# Patient Record
Sex: FEMALE | Race: WHITE | NOT HISPANIC OR LATINO | URBAN - METROPOLITAN AREA
[De-identification: names, ages, dates, MRNs, and addresses within clinical notes are randomized per-mention and may not be internally consistent; named-entity substitution may affect disease eponyms.]

---

## 2018-12-10 ENCOUNTER — EMERGENCY (EMERGENCY)
Facility: HOSPITAL | Age: 33
LOS: 1 days | Discharge: HOME | End: 2018-12-10
Attending: EMERGENCY MEDICINE
Payer: SELF-PAY

## 2018-12-10 VITALS
DIASTOLIC BLOOD PRESSURE: 74 MMHG | OXYGEN SATURATION: 100 % | SYSTOLIC BLOOD PRESSURE: 112 MMHG | TEMPERATURE: 98 F | HEART RATE: 72 BPM | RESPIRATION RATE: 18 BRPM

## 2018-12-10 VITALS — WEIGHT: 134.48 LBS | HEIGHT: 59.06 IN

## 2018-12-10 LAB
ALBUMIN SERPL ELPH-MCNC: 3.3 G/DL — LOW (ref 3.5–5)
ALP SERPL-CCNC: 150 U/L — HIGH (ref 40–120)
ALT FLD-CCNC: 51 U/L DA — SIGNIFICANT CHANGE UP (ref 10–60)
ANION GAP SERPL CALC-SCNC: 8 MMOL/L — SIGNIFICANT CHANGE UP (ref 5–17)
AST SERPL-CCNC: 29 U/L — SIGNIFICANT CHANGE UP (ref 10–40)
BASOPHILS # BLD AUTO: 0.1 K/UL — SIGNIFICANT CHANGE UP (ref 0–0.2)
BASOPHILS NFR BLD AUTO: 1 % — SIGNIFICANT CHANGE UP (ref 0–2)
BILIRUB SERPL-MCNC: 0.3 MG/DL — SIGNIFICANT CHANGE UP (ref 0.2–1.2)
BUN SERPL-MCNC: 10 MG/DL — SIGNIFICANT CHANGE UP (ref 7–18)
CALCIUM SERPL-MCNC: 8.2 MG/DL — LOW (ref 8.4–10.5)
CHLORIDE SERPL-SCNC: 107 MMOL/L — SIGNIFICANT CHANGE UP (ref 96–108)
CO2 SERPL-SCNC: 26 MMOL/L — SIGNIFICANT CHANGE UP (ref 22–31)
CREAT SERPL-MCNC: 0.64 MG/DL — SIGNIFICANT CHANGE UP (ref 0.5–1.3)
EOSINOPHIL # BLD AUTO: 0.1 K/UL — SIGNIFICANT CHANGE UP (ref 0–0.5)
EOSINOPHIL NFR BLD AUTO: 1.5 % — SIGNIFICANT CHANGE UP (ref 0–6)
GLUCOSE SERPL-MCNC: 97 MG/DL — SIGNIFICANT CHANGE UP (ref 70–99)
HCG UR QL: NEGATIVE — SIGNIFICANT CHANGE UP
HCT VFR BLD CALC: 35.2 % — SIGNIFICANT CHANGE UP (ref 34.5–45)
HGB BLD-MCNC: 11.3 G/DL — LOW (ref 11.5–15.5)
INR BLD: 1.06 RATIO — SIGNIFICANT CHANGE UP (ref 0.88–1.16)
LYMPHOCYTES # BLD AUTO: 1.6 K/UL — SIGNIFICANT CHANGE UP (ref 1–3.3)
LYMPHOCYTES # BLD AUTO: 23.5 % — SIGNIFICANT CHANGE UP (ref 13–44)
MCHC RBC-ENTMCNC: 29.8 PG — SIGNIFICANT CHANGE UP (ref 27–34)
MCHC RBC-ENTMCNC: 32 GM/DL — SIGNIFICANT CHANGE UP (ref 32–36)
MCV RBC AUTO: 93 FL — SIGNIFICANT CHANGE UP (ref 80–100)
MONOCYTES # BLD AUTO: 0.6 K/UL — SIGNIFICANT CHANGE UP (ref 0–0.9)
MONOCYTES NFR BLD AUTO: 8.9 % — SIGNIFICANT CHANGE UP (ref 2–14)
NEUTROPHILS # BLD AUTO: 4.5 K/UL — SIGNIFICANT CHANGE UP (ref 1.8–7.4)
NEUTROPHILS NFR BLD AUTO: 65.2 % — SIGNIFICANT CHANGE UP (ref 43–77)
PLATELET # BLD AUTO: 239 K/UL — SIGNIFICANT CHANGE UP (ref 150–400)
POTASSIUM SERPL-MCNC: 3.6 MMOL/L — SIGNIFICANT CHANGE UP (ref 3.5–5.3)
POTASSIUM SERPL-SCNC: 3.6 MMOL/L — SIGNIFICANT CHANGE UP (ref 3.5–5.3)
PROT SERPL-MCNC: 7.5 G/DL — SIGNIFICANT CHANGE UP (ref 6–8.3)
PROTHROM AB SERPL-ACNC: 11.8 SEC — SIGNIFICANT CHANGE UP (ref 10–12.9)
RBC # BLD: 3.78 M/UL — LOW (ref 3.8–5.2)
RBC # FLD: 12.6 % — SIGNIFICANT CHANGE UP (ref 10.3–14.5)
SODIUM SERPL-SCNC: 141 MMOL/L — SIGNIFICANT CHANGE UP (ref 135–145)
TROPONIN I SERPL-MCNC: <0.015 NG/ML — SIGNIFICANT CHANGE UP (ref 0–0.04)
TROPONIN I SERPL-MCNC: <0.015 NG/ML — SIGNIFICANT CHANGE UP (ref 0–0.04)
WBC # BLD: 6.9 K/UL — SIGNIFICANT CHANGE UP (ref 3.8–10.5)
WBC # FLD AUTO: 6.9 K/UL — SIGNIFICANT CHANGE UP (ref 3.8–10.5)

## 2018-12-10 PROCEDURE — 85610 PROTHROMBIN TIME: CPT

## 2018-12-10 PROCEDURE — 96360 HYDRATION IV INFUSION INIT: CPT

## 2018-12-10 PROCEDURE — 96361 HYDRATE IV INFUSION ADD-ON: CPT

## 2018-12-10 PROCEDURE — 84484 ASSAY OF TROPONIN QUANT: CPT

## 2018-12-10 PROCEDURE — 99284 EMERGENCY DEPT VISIT MOD MDM: CPT

## 2018-12-10 PROCEDURE — 80053 COMPREHEN METABOLIC PANEL: CPT

## 2018-12-10 PROCEDURE — 82962 GLUCOSE BLOOD TEST: CPT

## 2018-12-10 PROCEDURE — 99284 EMERGENCY DEPT VISIT MOD MDM: CPT | Mod: 25

## 2018-12-10 PROCEDURE — 86900 BLOOD TYPING SEROLOGIC ABO: CPT

## 2018-12-10 PROCEDURE — 86901 BLOOD TYPING SEROLOGIC RH(D): CPT

## 2018-12-10 PROCEDURE — 36415 COLL VENOUS BLD VENIPUNCTURE: CPT

## 2018-12-10 PROCEDURE — 85027 COMPLETE CBC AUTOMATED: CPT

## 2018-12-10 PROCEDURE — 93005 ELECTROCARDIOGRAM TRACING: CPT

## 2018-12-10 PROCEDURE — 81025 URINE PREGNANCY TEST: CPT

## 2018-12-10 PROCEDURE — 86850 RBC ANTIBODY SCREEN: CPT

## 2018-12-10 RX ORDER — SODIUM CHLORIDE 9 MG/ML
1000 INJECTION INTRAMUSCULAR; INTRAVENOUS; SUBCUTANEOUS ONCE
Qty: 0 | Refills: 0 | Status: COMPLETED | OUTPATIENT
Start: 2018-12-10 | End: 2018-12-10

## 2018-12-10 RX ADMIN — SODIUM CHLORIDE 1000 MILLILITER(S): 9 INJECTION INTRAMUSCULAR; INTRAVENOUS; SUBCUTANEOUS at 19:07

## 2018-12-10 RX ADMIN — SODIUM CHLORIDE 1000 MILLILITER(S): 9 INJECTION INTRAMUSCULAR; INTRAVENOUS; SUBCUTANEOUS at 17:06

## 2018-12-10 NOTE — ED ADULT NURSE NOTE - OBJECTIVE STATEMENT
pt is a 32y/o female with c/o  syncope or dizziness states she felt lightheaded and  she passed out.

## 2018-12-10 NOTE — ED PROVIDER NOTE - PROGRESS NOTE DETAILS
schmidt: trop x 2 neg, isolated TWI V2.    dx syncope likely vasovagal.  f/u with cardio for holter/echo. left ambulatory.  return precautions given.

## 2018-12-10 NOTE — ED PROVIDER NOTE - OBJECTIVE STATEMENT
33 yr old female with hx of anemia presents to ed for syncope last night.  pt states stood up felt lighthead/dizzy and weak with palpitations then syncopized.  no incontinence, no seizure, no sob, no head trauma, no fever, no chills, no abd pain, no headache, no fam hx sudden cardiac death.  2-3 yr ago had similar sx and was told possibly anemia.  normal period cycle last 5 days but heavy.    [place] - [phone]

## 2018-12-10 NOTE — ED PROVIDER NOTE - MEDICAL DECISION MAKING DETAILS
33 yr old female with hx of anemia presents to ed for syncope last night.  pt states stood up felt lighthead/dizzy and weak with palpitations then syncopized.  no incontinence, no seizure, no sob, no head trauma, no fever, no chills, no abd pain, no headache, no fam hx sudden cardiac death.  2-3 yr ago had similar sx and was told possibly anemia.  normal period cycle last 5 days but heavy.    syncope- not consistent with seizure, no stroke like sx.  will obtain labs, ekg, cardiac monitor to r/o anemia vs arrhythmia vs lytes imbalance vs ectopic pregnancy

## 2020-12-23 NOTE — ED PROVIDER NOTE - CARE PLAN
Catheter removed. Sterile bandage to site. No evidence of bleeding or hematoma.  
Thoracentesis catheter placed  
Time out performed.  
Principal Discharge DX:	Syncope

## 2024-11-12 ENCOUNTER — APPOINTMENT (OUTPATIENT)
Dept: ANTEPARTUM | Facility: CLINIC | Age: 39
End: 2024-11-12
Payer: MEDICAID

## 2024-11-12 ENCOUNTER — ASOB RESULT (OUTPATIENT)
Age: 39
End: 2024-11-12

## 2024-11-12 DIAGNOSIS — O43.109 MALFORMATION OF PLACENTA, UNSPECIFIED, UNSPECIFIED TRIMESTER: ICD-10-CM

## 2024-11-12 PROCEDURE — 99205 OFFICE O/P NEW HI 60 MIN: CPT | Mod: 25

## 2024-11-12 PROCEDURE — 76805 OB US >/= 14 WKS SNGL FETUS: CPT

## 2024-11-12 PROCEDURE — ZZZZZ: CPT

## 2024-11-12 PROCEDURE — 76817 TRANSVAGINAL US OBSTETRIC: CPT

## 2024-11-13 ENCOUNTER — ASOB RESULT (OUTPATIENT)
Age: 39
End: 2024-11-13

## 2024-11-13 ENCOUNTER — NON-APPOINTMENT (OUTPATIENT)
Age: 39
End: 2024-11-13

## 2024-11-13 ENCOUNTER — APPOINTMENT (OUTPATIENT)
Dept: ANTEPARTUM | Facility: CLINIC | Age: 39
End: 2024-11-13
Payer: MEDICAID

## 2024-11-13 PROCEDURE — 99367 TEAM CONF W/O PAT BY PHYS: CPT

## 2024-11-14 ENCOUNTER — INPATIENT (INPATIENT)
Facility: HOSPITAL | Age: 39
LOS: 4 days | Discharge: HOME | End: 2024-11-19
Attending: OBSTETRICS & GYNECOLOGY | Admitting: OBSTETRICS & GYNECOLOGY
Payer: COMMERCIAL

## 2024-11-14 VITALS
HEART RATE: 78 BPM | DIASTOLIC BLOOD PRESSURE: 76 MMHG | TEMPERATURE: 98 F | RESPIRATION RATE: 18 BRPM | HEIGHT: 67 IN | SYSTOLIC BLOOD PRESSURE: 127 MMHG | OXYGEN SATURATION: 96 % | WEIGHT: 205.03 LBS

## 2024-11-14 DIAGNOSIS — O26.899 OTHER SPECIFIED PREGNANCY RELATED CONDITIONS, UNSPECIFIED TRIMESTER: ICD-10-CM

## 2024-11-14 DIAGNOSIS — Z34.80 ENCOUNTER FOR SUPERVISION OF OTHER NORMAL PREGNANCY, UNSPECIFIED TRIMESTER: ICD-10-CM

## 2024-11-14 LAB
ALBUMIN SERPL ELPH-MCNC: 3.1 G/DL — LOW (ref 3.3–5)
ALBUMIN SERPL ELPH-MCNC: 3.3 G/DL — SIGNIFICANT CHANGE UP (ref 3.3–5)
ALP SERPL-CCNC: 211 U/L — HIGH (ref 40–120)
ALP SERPL-CCNC: 214 U/L — HIGH (ref 40–120)
ALT FLD-CCNC: 23 U/L — SIGNIFICANT CHANGE UP (ref 10–45)
ALT FLD-CCNC: 23 U/L — SIGNIFICANT CHANGE UP (ref 10–45)
ANION GAP SERPL CALC-SCNC: 15 MMOL/L — SIGNIFICANT CHANGE UP (ref 5–17)
ANION GAP SERPL CALC-SCNC: 17 MMOL/L — SIGNIFICANT CHANGE UP (ref 5–17)
APTT BLD: 26.6 SEC — SIGNIFICANT CHANGE UP (ref 24.5–35.6)
APTT BLD: 27.3 SEC — SIGNIFICANT CHANGE UP (ref 24.5–35.6)
AST SERPL-CCNC: 25 U/L — SIGNIFICANT CHANGE UP (ref 10–40)
AST SERPL-CCNC: 25 U/L — SIGNIFICANT CHANGE UP (ref 10–40)
BASOPHILS # BLD AUTO: 0.03 K/UL — SIGNIFICANT CHANGE UP (ref 0–0.2)
BASOPHILS # BLD AUTO: 0.03 K/UL — SIGNIFICANT CHANGE UP (ref 0–0.2)
BASOPHILS NFR BLD AUTO: 0.2 % — SIGNIFICANT CHANGE UP (ref 0–2)
BASOPHILS NFR BLD AUTO: 0.3 % — SIGNIFICANT CHANGE UP (ref 0–2)
BILIRUB SERPL-MCNC: 0.3 MG/DL — SIGNIFICANT CHANGE UP (ref 0.2–1.2)
BILIRUB SERPL-MCNC: 0.4 MG/DL — SIGNIFICANT CHANGE UP (ref 0.2–1.2)
BUN SERPL-MCNC: 6 MG/DL — LOW (ref 7–23)
BUN SERPL-MCNC: 8 MG/DL — SIGNIFICANT CHANGE UP (ref 7–23)
CALCIUM SERPL-MCNC: 8.8 MG/DL — SIGNIFICANT CHANGE UP (ref 8.4–10.5)
CALCIUM SERPL-MCNC: 9.2 MG/DL — SIGNIFICANT CHANGE UP (ref 8.4–10.5)
CHLORIDE SERPL-SCNC: 104 MMOL/L — SIGNIFICANT CHANGE UP (ref 96–108)
CHLORIDE SERPL-SCNC: 106 MMOL/L — SIGNIFICANT CHANGE UP (ref 96–108)
CO2 SERPL-SCNC: 15 MMOL/L — LOW (ref 22–31)
CO2 SERPL-SCNC: 18 MMOL/L — LOW (ref 22–31)
CREAT SERPL-MCNC: 0.48 MG/DL — LOW (ref 0.5–1.3)
CREAT SERPL-MCNC: 0.53 MG/DL — SIGNIFICANT CHANGE UP (ref 0.5–1.3)
EGFR: 120 ML/MIN/1.73M2 — SIGNIFICANT CHANGE UP
EGFR: 123 ML/MIN/1.73M2 — SIGNIFICANT CHANGE UP
EOSINOPHIL # BLD AUTO: 0.01 K/UL — SIGNIFICANT CHANGE UP (ref 0–0.5)
EOSINOPHIL # BLD AUTO: 0.12 K/UL — SIGNIFICANT CHANGE UP (ref 0–0.5)
EOSINOPHIL NFR BLD AUTO: 0.1 % — SIGNIFICANT CHANGE UP (ref 0–6)
EOSINOPHIL NFR BLD AUTO: 1.2 % — SIGNIFICANT CHANGE UP (ref 0–6)
FIBRINOGEN PPP-MCNC: 518 MG/DL — HIGH (ref 200–445)
FIBRINOGEN PPP-MCNC: 544 MG/DL — HIGH (ref 200–445)
GLUCOSE BLDC GLUCOMTR-MCNC: 109 MG/DL — HIGH (ref 70–99)
GLUCOSE SERPL-MCNC: 105 MG/DL — HIGH (ref 70–99)
GLUCOSE SERPL-MCNC: 79 MG/DL — SIGNIFICANT CHANGE UP (ref 70–99)
HCT VFR BLD CALC: 30.9 % — LOW (ref 34.5–45)
HCT VFR BLD CALC: 32.7 % — LOW (ref 34.5–45)
HGB BLD-MCNC: 10.2 G/DL — LOW (ref 11.5–15.5)
HGB BLD-MCNC: 10.7 G/DL — LOW (ref 11.5–15.5)
IMM GRANULOCYTES NFR BLD AUTO: 0.7 % — SIGNIFICANT CHANGE UP (ref 0–0.9)
IMM GRANULOCYTES NFR BLD AUTO: 0.7 % — SIGNIFICANT CHANGE UP (ref 0–0.9)
INR BLD: 0.89 RATIO — SIGNIFICANT CHANGE UP (ref 0.85–1.16)
LYMPHOCYTES # BLD AUTO: 1.28 K/UL — SIGNIFICANT CHANGE UP (ref 1–3.3)
LYMPHOCYTES # BLD AUTO: 2.13 K/UL — SIGNIFICANT CHANGE UP (ref 1–3.3)
LYMPHOCYTES # BLD AUTO: 20.6 % — SIGNIFICANT CHANGE UP (ref 13–44)
LYMPHOCYTES # BLD AUTO: 9.9 % — LOW (ref 13–44)
MCHC RBC-ENTMCNC: 29.6 PG — SIGNIFICANT CHANGE UP (ref 27–34)
MCHC RBC-ENTMCNC: 29.7 PG — SIGNIFICANT CHANGE UP (ref 27–34)
MCHC RBC-ENTMCNC: 32.7 G/DL — SIGNIFICANT CHANGE UP (ref 32–36)
MCHC RBC-ENTMCNC: 33 G/DL — SIGNIFICANT CHANGE UP (ref 32–36)
MCV RBC AUTO: 89.8 FL — SIGNIFICANT CHANGE UP (ref 80–100)
MCV RBC AUTO: 90.6 FL — SIGNIFICANT CHANGE UP (ref 80–100)
MONOCYTES # BLD AUTO: 0.1 K/UL — SIGNIFICANT CHANGE UP (ref 0–0.9)
MONOCYTES # BLD AUTO: 0.6 K/UL — SIGNIFICANT CHANGE UP (ref 0–0.9)
MONOCYTES NFR BLD AUTO: 0.8 % — LOW (ref 2–14)
MONOCYTES NFR BLD AUTO: 5.8 % — SIGNIFICANT CHANGE UP (ref 2–14)
NEUTROPHILS # BLD AUTO: 11.38 K/UL — HIGH (ref 1.8–7.4)
NEUTROPHILS # BLD AUTO: 7.41 K/UL — HIGH (ref 1.8–7.4)
NEUTROPHILS NFR BLD AUTO: 71.4 % — SIGNIFICANT CHANGE UP (ref 43–77)
NEUTROPHILS NFR BLD AUTO: 88.3 % — HIGH (ref 43–77)
NRBC # BLD: 0 /100 WBCS — SIGNIFICANT CHANGE UP (ref 0–0)
NRBC # BLD: 0 /100 WBCS — SIGNIFICANT CHANGE UP (ref 0–0)
PLATELET # BLD AUTO: 305 K/UL — SIGNIFICANT CHANGE UP (ref 150–400)
PLATELET # BLD AUTO: 329 K/UL — SIGNIFICANT CHANGE UP (ref 150–400)
POTASSIUM SERPL-MCNC: 3.6 MMOL/L — SIGNIFICANT CHANGE UP (ref 3.5–5.3)
POTASSIUM SERPL-MCNC: 4.1 MMOL/L — SIGNIFICANT CHANGE UP (ref 3.5–5.3)
POTASSIUM SERPL-SCNC: 3.6 MMOL/L — SIGNIFICANT CHANGE UP (ref 3.5–5.3)
POTASSIUM SERPL-SCNC: 4.1 MMOL/L — SIGNIFICANT CHANGE UP (ref 3.5–5.3)
PROT SERPL-MCNC: 6.6 G/DL — SIGNIFICANT CHANGE UP (ref 6–8.3)
PROT SERPL-MCNC: 6.7 G/DL — SIGNIFICANT CHANGE UP (ref 6–8.3)
PROTHROM AB SERPL-ACNC: 10.2 SEC — SIGNIFICANT CHANGE UP (ref 9.9–13.4)
RBC # BLD: 3.44 M/UL — LOW (ref 3.8–5.2)
RBC # BLD: 3.61 M/UL — LOW (ref 3.8–5.2)
RBC # FLD: 13.2 % — SIGNIFICANT CHANGE UP (ref 10.3–14.5)
RBC # FLD: 13.3 % — SIGNIFICANT CHANGE UP (ref 10.3–14.5)
SODIUM SERPL-SCNC: 136 MMOL/L — SIGNIFICANT CHANGE UP (ref 135–145)
SODIUM SERPL-SCNC: 139 MMOL/L — SIGNIFICANT CHANGE UP (ref 135–145)
WBC # BLD: 10.36 K/UL — SIGNIFICANT CHANGE UP (ref 3.8–10.5)
WBC # BLD: 12.89 K/UL — HIGH (ref 3.8–10.5)
WBC # FLD AUTO: 10.36 K/UL — SIGNIFICANT CHANGE UP (ref 3.8–10.5)
WBC # FLD AUTO: 12.89 K/UL — HIGH (ref 3.8–10.5)

## 2024-11-14 PROCEDURE — 99253 IP/OBS CNSLTJ NEW/EST LOW 45: CPT

## 2024-11-14 PROCEDURE — 72195 MRI PELVIS W/O DYE: CPT | Mod: 26

## 2024-11-14 RX ORDER — 0.9 % SODIUM CHLORIDE 0.9 %
1000 INTRAVENOUS SOLUTION INTRAVENOUS
Refills: 0 | Status: DISCONTINUED | OUTPATIENT
Start: 2024-11-14 | End: 2024-11-15

## 2024-11-14 RX ORDER — .BETA.-CAROTENE, SODIUM ACETATE, ASCORBIC ACID, CHOLECALCIFEROL, .ALPHA.-TOCOPHEROL ACETATE, DL-, THIAMINE MONONITRATE, RIBOFLAVIN, NIACINAMIDE, PYRIDOXINE HYDROCHLORIDE, FOLIC ACID, CYANOCOBALAMIN, CALCIUM CARBONATE, FERROUS FUMARATE, ZINC OXIDE AND CUPRIC OXIDE 2000; 2000; 120; 400; 22; 1.84; 3; 20; 10; 1; 12; 200; 27; 25; 2 [IU]/1; [IU]/1; MG/1; [IU]/1; MG/1; MG/1; MG/1; MG/1; MG/1; MG/1; UG/1; MG/1; MG/1; MG/1; MG/1
1 TABLET ORAL DAILY
Refills: 0 | Status: DISCONTINUED | OUTPATIENT
Start: 2024-11-14 | End: 2024-11-15

## 2024-11-14 RX ORDER — HEPARIN SODIUM,PORCINE 1000/ML
5000 VIAL (ML) INJECTION EVERY 8 HOURS
Refills: 0 | Status: DISCONTINUED | OUTPATIENT
Start: 2024-11-14 | End: 2024-11-14

## 2024-11-14 RX ORDER — NIFEDIPINE 10 MG
10 CAPSULE ORAL
Refills: 0 | Status: DISCONTINUED | OUTPATIENT
Start: 2024-11-14 | End: 2024-11-14

## 2024-11-14 RX ORDER — NIFEDIPINE 10 MG
30 CAPSULE ORAL DAILY
Refills: 0 | Status: DISCONTINUED | OUTPATIENT
Start: 2024-11-14 | End: 2024-11-14

## 2024-11-14 RX ORDER — GLUCOSAMINE SULFATE DIPOT CHLR 500 MG
1 CAPSULE ORAL DAILY
Refills: 0 | Status: DISCONTINUED | OUTPATIENT
Start: 2024-11-14 | End: 2024-11-15

## 2024-11-14 RX ORDER — NIFEDIPINE 10 MG
10 CAPSULE ORAL EVERY 6 HOURS
Refills: 0 | Status: DISCONTINUED | OUTPATIENT
Start: 2024-11-14 | End: 2024-11-14

## 2024-11-14 RX ORDER — BETAMETHASONE ACETATE,SOD PHOS 6 MG/ML
12 VIAL (ML) INJECTION EVERY 24 HOURS
Refills: 0 | Status: DISCONTINUED | OUTPATIENT
Start: 2024-11-14 | End: 2024-11-14

## 2024-11-14 RX ORDER — BETAMETHASONE ACETATE,SOD PHOS 6 MG/ML
12 VIAL (ML) INJECTION ONCE
Refills: 0 | Status: COMPLETED | OUTPATIENT
Start: 2024-11-15 | End: 2024-11-15

## 2024-11-14 RX ORDER — NIFEDIPINE 10 MG
10 CAPSULE ORAL EVERY 6 HOURS
Refills: 0 | Status: DISCONTINUED | OUTPATIENT
Start: 2024-11-14 | End: 2024-11-15

## 2024-11-14 RX ORDER — NIFEDIPINE 10 MG
10 CAPSULE ORAL
Refills: 0 | Status: COMPLETED | OUTPATIENT
Start: 2024-11-14 | End: 2024-11-14

## 2024-11-14 RX ADMIN — Medication 10 MILLIGRAM(S): at 23:32

## 2024-11-14 RX ADMIN — Medication 50 MILLILITER(S): at 18:39

## 2024-11-14 RX ADMIN — Medication 12 MILLIGRAM(S): at 14:37

## 2024-11-14 RX ADMIN — Medication 10 MILLIGRAM(S): at 17:30

## 2024-11-14 RX ADMIN — Medication 10 MILLIGRAM(S): at 17:07

## 2024-11-14 RX ADMIN — Medication 300 GRAM(S): at 18:43

## 2024-11-14 RX ADMIN — Medication 10 MILLIGRAM(S): at 16:44

## 2024-11-14 NOTE — CONSULT NOTE ADULT - SUBJECTIVE AND OBJECTIVE BOX
HPI:  39y Female 33wkGA presents with concern for placenta accreta with possible invasion into bladder wall. Urology consulted for intra-operative assistance and ureteral catheter placement.   Pt seen at bedside. Complaining of stress incontinence that started prior to current pregnancy.     PAST MEDICAL & SURGICAL HISTORY:  No pertinent past medical history      No significant past surgical history          FAMILY HISTORY:  No known  malignancy     Denies alcohol and drug abuse, nonsmoker     MEDICATIONS  (STANDING):  betamethasone Injectable 12 milliGRAM(s) IntraMuscular every 24 hours  folic acid 1 milliGRAM(s) Oral daily  heparin   Injectable 5000 Unit(s) SubCutaneous every 8 hours  prenatal multivitamin 1 Tablet(s) Oral daily    MEDICATIONS  (PRN):    Allergies    No Known Allergies    Intolerances      REVIEW OF SYSTEMS: Pertinent positives and negatives as stated in HPI, otherwise negative    Vital signs  T(C): 36.9 (11-14-24 @ 10:05), Max: 36.9 (11-14-24 @ 10:05)  HR: 84 (11-14-24 @ 12:52)  BP: 125/76 (11-14-24 @ 10:31)  SpO2: 95% (11-14-24 @ 12:52)  Wt(kg): --    Physical Exam  Gen: NAD  HEENT: normocephalic, atraumatic, no scleral icterus  Pulm: No respiratory distress, no subcostal retractions, no accessory muscle use   Abd: distended 2/2 pregnancy   : Voiding   MSK: Moving all extremities, full ROM in all extremities  NEURO: A&Ox3, no focal neurological deficits, CN 2-12 grossly intact  SKIN: warm, dry    LABS:  CBC   BMP               Urine Cx:   Blood Cx:    Radiology: HPI:  39y Female 33wkGA presents with concern for placenta accreta with possible invasion into bladder wall. Urology consulted for intra-operative assistance and ureteral catheter placement.   [place]  bradenma #[id number]  Pt seen at bedside. Complaining of stress incontinence that started prior to current pregnancy.     PAST MEDICAL & SURGICAL HISTORY:  No pertinent past medical history      No significant past surgical history          FAMILY HISTORY:  No known  malignancy     Denies alcohol and drug abuse, nonsmoker     MEDICATIONS  (STANDING):  betamethasone Injectable 12 milliGRAM(s) IntraMuscular every 24 hours  folic acid 1 milliGRAM(s) Oral daily  heparin   Injectable 5000 Unit(s) SubCutaneous every 8 hours  prenatal multivitamin 1 Tablet(s) Oral daily    MEDICATIONS  (PRN):    Allergies    No Known Allergies    Intolerances      REVIEW OF SYSTEMS: Pertinent positives and negatives as stated in HPI, otherwise negative    Vital signs  T(C): 36.9 (11-14-24 @ 10:05), Max: 36.9 (11-14-24 @ 10:05)  HR: 84 (11-14-24 @ 12:52)  BP: 125/76 (11-14-24 @ 10:31)  SpO2: 95% (11-14-24 @ 12:52)  Wt(kg): --    Physical Exam  Gen: NAD  HEENT: normocephalic, atraumatic, no scleral icterus  Pulm: No respiratory distress, no subcostal retractions, no accessory muscle use   Abd: distended 2/2 pregnancy   : Voiding   MSK: Moving all extremities, full ROM in all extremities  NEURO: A&Ox3, no focal neurological deficits, CN 2-12 grossly intact  SKIN: warm, dry    LABS:  CBC   BMP               Urine Cx:   Blood Cx:    Radiology:

## 2024-11-14 NOTE — CHART NOTE - NSCHARTNOTEFT_GEN_A_CORE
0564 (Entry delayed secondary to clinical duties)  - [place]  #[id number]    Patient evaluated. Patient denying any contractions, abdominal pain, or further vaginal bleeding    Abd: Soft. Non-tender. Gravid   : No bleeding seen between patient's legs    38yo  at 33w3d with suspected increta who had an episode of vaginal bleeding prior to her MRI. Patient has had no vaginal bleeding since her initial evaluation. However, patient has been observed to be martha on the monitor (notes she is not feeling them). Given the persistence of the contractions espite initiation of Nifedipine tocolysis and recent vaginal bleeding, the myriad of consultants have been made aware of this change in clinical status and that tentative clinical plans may change. Current plan is as follows:  - c/w Nifedipine tocolysis  - Mg per MFM  - OR aware of patient  - Gyn/Onc aware  - IR aware  - Trauma surgery aware    Yuri Patel, PGY-3  Obstetrics & Gynecology     d/w Dr. RENETTA Rosenbaum, [facility]

## 2024-11-14 NOTE — CONSULT NOTE PEDS - ASSESSMENT
I met with Ms. Connors on the antepartum unit and discussed what to expect should she deliver at 33 weeks gestation.    She is a 40yo  @ 33w4d who presents for care coordination in the setting of recent ultrasound imaging findings concerning for accreta vs. increta on 2024. EFW 2818g.     1.	The NICU team will be present at her delivery and will immediately assess and care for her infant.  2.	Overall survival at 33 weeks gestation is 98%.   3.	The infant will likely require respiratory support, either in the form of nasal CPAP or intubation and mechanical ventilation.  4.	The infant will be at risk for jaundice which can be treated with phototherapy.  5.	Depending on the clinical status of the infant, enteral feedings may not be started immediately. The infant will receive IVF/IV nutrition as necessary. The infant is also at risk for hypoglycemia. Due to immature suck/swallow, the infant may require an orogastric tube once feeds are initiated.  6.	The infant will be screened for infection and treated with antibiotics at birth. If the infant's clinical status changes during his NICU course, he will again be screened and treated for infection.   7.	The infant is at risk for thermoregulation issues.    8.	All premature infants are at risk for developmental delays and will be monitored for the first two years of life by developmental pediatricians.   9.	Average length of stay is about 3 weeks.    Ms. Connors had the opportunity to ask questions and may contact the NICU at any time if further question arise.    Thank you for the opportunity to participate in the care of this patient and please inform us of any changes in her status.     number [phone]

## 2024-11-14 NOTE — CHART NOTE - NSCHARTNOTEFT_GEN_A_CORE
Patient evaluated at bedside for vaginal bleeding. Patient reports feeling a gush of blood. She denies feeling contraction pain    Vital Signs Last 24 Hrs  T(C): 36.8 (2024 18:57), Max: 36.9 (2024 10:05)  T(F): 98.24 (2024 18:57), Max: 98.4 (2024 10:05)  HR: 93 (2024 22:19) (76 - 127)  BP: 124/75 (2024 22:12) (100/52 - 152/77)  BP(mean): --  RR: 14 (2024 18:57) (14 - 18)  SpO2: 98% (2024 22:19) (92% - 100%)    Parameters below as of 2024 10:05  Patient On (Oxygen Delivery Method): room air    Physical exam:  Gen: well-appearing, NAD  Abd: soft, nontender  Vacc dark, old clot, no active bleeding from cervical os on speculum exam      38yo  at 33w3d with suspected increta now with vaginal bleeding 25cc of dark clot, no active bleeding from os. Vital signs stable. Patient with contractions on TOCO but does not feel pain. Given that patient has no active bleeding, plan is to continue to monitor. No delivery at this time.  - Reassess in 2 hours  - Early BMZ at 230am  - STAT labs  - AM labs  - c/w Nifedipine tocolysis  - Mg per MFM  - OR aware of patient  - Urology aware, plan for ureteral catheter placement   - Anesthesia aware  - Gyn/Onc aware  - IR aware  - Trauma surgery aware, plan for [place]  - Plan for  hysterectomy in AM    Patient seen and examined with Dr. Suero and Dr. Addie Benitez, PGY3

## 2024-11-14 NOTE — OB RN PATIENT PROFILE - NS_PAINMANGEPLANS_OBGYN_ALL_OB
Marcos Cabral Md  [address]  [facility], [address]       05/26/18        Patient: Brittany Young   YOB: 2008   Date of Visit: 5/26/2018       Dear  Dr. Brenton Cruz MD,      Thank you for referring Brittany Mosesness to [facility]
None

## 2024-11-14 NOTE — OB RN PATIENT PROFILE - MENTAL HEALTH CONDITIONS/SYMPTOMS, PROFILE
Continue [facility] soft diet with thin liquids as tolerated per SLP recommendations. Aspiration precautions and close monitoring of all po intake. none

## 2024-11-14 NOTE — OB RN TRIAGE NOTE - FALL HARM RISK - ATTEMPT OOB
No Taltz Pregnancy And Lactation Text: The risk during pregnancy and breastfeeding is uncertain with this medication.

## 2024-11-14 NOTE — CHART NOTE - NSCHARTNOTEFT_GEN_A_CORE
PA Note  Notified by RN that patient noted vaginal bleeding after using the bathroom. Red blood noted on pad  SSE: approx 25cc red to dark reg blood noted in vagina, cervix appears closed, no active bleeding.  Will continue to closely monitor. Accompanied pt to MRI.  Gina Marshall and Jorge Daigle PA-C

## 2024-11-14 NOTE — CONSULT NOTE ADULT - ASSESSMENT
39y Female 33wkGA presents with concern for placenta accreta with possible invasion into bladder wall. Urology consulted for intra-operative assistance and ureteral catheter placement.     Recs  - Primary team planning for surgery 11/19  - Please obtain UA and UCx  - Pt can follow-up out-pt for further management of stress incontinence if she desires once she's safe for discharge     [facility]  [address] [place] [id number] [phone]

## 2024-11-14 NOTE — OB PROVIDER H&P - ASSESSMENT
A/P: Pt is a 39y G_P_ who presents [active labor/SROM/PROM/ for induction of labor].      1. Admit to Labor and Delivery. Routine Labs. IV Fluids  2. Expectant Management vs. IOL  3. Fetus: Vertex, Reactive/Continue fetal monitoring  4.   5. GBS pos, for Amp / GBS neg  6. Pain: IV pain meds/epidural PRN      Yuri Patel, PGY-  Obstetrics and Gynecology    Discussed with  A/P: Pt is a 38yo  @33w4d who presents for care coordination in the setting of suspected placenta increta. Patient overall asymptomatic at time of initial evaluation without vaginal bleeding, pain, or contractions.    Patient was extensively counseled regarding the suspected increta and the next steps, notably care coordination with multiple consultants and their respective roles in their care): gyn/onc (for hysterectomy and bilateral salpingectomy) urology (for cysto and ureteral cath placement), IR (for procedural intervention to assist with bleeding), NICU (to review implications of pre-term delivery), and trauma surgery (in event measures are needed to mitigate hemorrhage and/or if there is adjacent organ injury). Will obtain pelvic MRI. Tentative plan discussed with the patient is for  hysterectomy, bilateral salpingectomy, cystoscopy, and bilateral ureteral catheter placement. Patient was consented for the aforementioned. Discussed that after hysterectomy, she will no longer be able to have future children. Reviewed that post-op course can potentially include SICU stay. Patient and partner verbalized understanding.     Additionally, we reviewed the plan to start Betamethasone given planned pre-term delivery. Patient and partner verbalized understanding in regards to the above. Given the opportunity to ask questions and have concerns addressed. All questions were answered to the patient's apparent satisfaction. They are aware that if there any changes in her clinical status     #Suspected Increta   - Pelvic MRI ordered   - Consulting with GYN/Onc, trauma surgery, IR, and urology  - 4 U of pRBCs on hold   - CBC and coags upon presentation    #Fetal status  - Continuous monitoring. Tracing has been reactive thus far    - BMZ for fetal lung maturity  - NICU consulted    #Maternal status  - NPO  - SCDs while in bed  - LR while NPO    Yuri Patel, PGY-3  Obstetrics and Gynecology    seen and evaluated w/ Dr. RENETTA Marshall-Rolando De La Torre, obgyn service attending, aware A/P: Pt is a 38yo  @33w4d who presents for care coordination in the setting of suspected placenta accreta vs. increta. Patient overall asymptomatic at time of initial evaluation without vaginal bleeding, pain, or contractions.    Patient was extensively counseled regarding the suspected increta and the next steps, notably care coordination with multiple consultants and their respective roles in their care): gyn/onc (for hysterectomy and bilateral salpingectomy) urology (for cysto and ureteral cath placement), IR (for procedural intervention to assist with bleeding), NICU (to review implications of pre-term delivery), and trauma surgery (in event measures are needed to mitigate hemorrhage and/or if there is adjacent organ injury). Will obtain pelvic MRI. Tentative plan discussed with the patient is for  hysterectomy, bilateral salpingectomy, cystoscopy, and bilateral ureteral catheter placement. Patient was consented for the aforementioned. Discussed that after hysterectomy, she will no longer be able to have future children. Reviewed that post-op course can potentially include SICU stay. Patient and partner verbalized understanding.     Additionally, we reviewed the plan to start Betamethasone given planned pre-term delivery. Patient and partner verbalized understanding in regards to the above. Given the opportunity to ask questions and have concerns addressed. All questions were answered to the patient's apparent satisfaction. They are aware that if there any changes in her clinical status     #Suspected accreta vs. increta   - Pelvic MRI ordered   - Consulting with GYN/Onc, trauma surgery, IR, and urology  - 4 U of pRBCs on hold   - CBC and coags upon presentation    #Fetal status  - Continuous monitoring. Tracing has been reactive thus far    - [facility] for fetal lung maturity  - NICU consulted    #Maternal status  - NPO  - SCDs while in bed  - LR while NPO    Yuri Patel, PGY-3  Obstetrics and Gynecology    seen and evaluated w/ Dr. RENETTA Marshall-Rolando De La Torre, obgyn service attending, aware

## 2024-11-14 NOTE — OB RN TRIAGE NOTE - NSMATERNALFETALCONCERNS_OBGYN_ALL_OB_FT
Maternal/Fetal Alert  11/13/24 - Suspected placenta accreta spectrum. Maternal mdm held today.  Please see minutes for details. -Opal Stratton, ALYSONC

## 2024-11-14 NOTE — OB PROVIDER H&P - HISTORY OF PRESENT ILLNESS
HPI: Pt is a 38yo  @ 33w3d who presents for care coordination in the setting of recent ultrasound imaging findings concerning for accreta vs. increta on 2024.  Fetal movement (+)  Leakage of fluid (-)  Contractions (-)  Vaginal bleeding (-)  GBS pos/neg  Estimated fetal weight:    Prenatal care complicated by: Above  - Patient had a known complete previa seen previously      - Low risk NIPs  - Nl hr GCT  - Elevated JOCELYN (24.5) w/ EFW 2818g (98%) and AC >99%     OBHx:  - . . 3400g. Uncomplicated. In [facility]  - . . 3600g. [facility]plicatedIn [facility]  - . D&C for miscarriage  - 2016. FT. Emergent pLTCS for NRFHT. 4262g  GynHx:   PMHx: Denies  PSHx: Denies  Med: PNV  All: NKDA  Psych: Denies hx of mental health issues  SH: Denies hx of smoking, drinking, or drug usage during the pregnancy    Vital Signs Last 24 Hrs  T(C): --  T(F): --  HR: 85 (2024 10:13) (85 - 85)  BP: 140/74 (2024 10:13) (140/74 - 140/74)  BP(mean): --  RR: --  SpO2: --        SVE:  FHT:  [place]:  Sono: Vertex           HPI: Pt is a 38yo  @ 33w3d who presents for care coordination in the setting of recent ultrasound imaging findings concerning for accreta vs. increta on 2024.  Fetal movement (+)  Leakage of fluid (-)  Contractions (-)  Vaginal bleeding (-)  GBS pos/neg  Estimated fetal weight:    Prenatal care complicated by: Above  - Patient had a known complete previa seen previously      - Low risk NIPs  - Nl hr GCT  - Elevated JOCELYN (24.5) w/ EFW 2818g (98%) and AC >99%     OBHx:  - . . 3400g. Uncomplicated. In [facility]  - . . 3600g. Uncomplicated. In [facility]  - . D&C for miscarriage  - 2016. FT. Emergent pLTCS for NRFHT. 4262g  GynHx:   PMHx: Denies  PSHx: Denies  Med: PNV  All: NKDA  Psych: Denies hx of mental health issues  SH: Denies hx of smoking, drinking, or drug usage during the pregnancy    Vital Signs Last 24 Hrs  T(C): --  T(F): --  HR: 85 (2024 10:13) (85 - 85)  BP: 140/74 (2024 10:13) (140/74 - 140/74)  BP(mean): --  RR: --  SpO2: --        SVE:  FHT:  [place]:  Sono: Vertex           HPI: Pt is a 40yo  @ 33w4d who presents for care coordination in the setting of recent ultrasound imaging findings concerning for accreta vs. increta on 2024.  Fetal movement (+)  Leakage of fluid (-)  Contractions (-)  Vaginal bleeding (-)  GBS unk  Estimated fetal weight: 2818g,     Prenatal care complicated by: Above  - Patient has a complete previa     - Low risk NIPs  - Nl hr GCT  - Elevated JOCELYN (24.5) w/ EFW 2818g (98%) and AC >99%     OBHx:  - . . 3400g. Uncomplicated. In [facility]  - . . 3600g. Uncomplicated. In [facility]  - . D&C for miscarriage  - 2016. FT. Emergent pLTCS for NRFHT. 4262g  GynHx: Denies  PMHx: Denies  PSHx: Above   Med: PNV  All: NKDA  SH: Denies hx of smoking, drinking, or drug usage during the pregnancy    Vital Signs Last 24 Hrs  T(C): --  T(F): --  HR: 85 (2024 10:13) (85 - 85)  BP: 140/74 (2024 10:13) (140/74 - 140/74)  BP(mean): --  RR: --  SpO2: --        SVE:  FHT:  [place]:  Sono: Vertex           [facility]  # [id number]    HPI: Pt is a 38yo  @ 33w4d who presents for care coordination in the setting of recent ultrasound imaging findings concerning for accreta vs. increta on 2024.  Fetal movement (+)  Leakage of fluid (-)  Contractions, denied feeling at time of inital eval (some ctx noted on the monitor however)  Vaginal bleeding (-)  GBS unk  Estimated fetal weight: 2818g,     Prenatal care complicated by: Above  - Patient has a complete previa     - Low risk NIPs  - Nl hr GCT  - Elevated JOCELYN (24.5) w/ EFW 2818g (98%) and AC >99%     OBHx:  - . . 3400g. Uncomplicated. In [facility]  - . . 3600g. Uncomplicated. In [facility]  - . D&C for miscarriage  - 2016. FT. Emergent pLTCS for NRFHT. 4262g  GynHx: Denies  PMHx: Denies  PSHx: Above   Med: PNV  All: NKDA  SH: Denies hx of smoking, drinking, or drug usage during the pregnancy    Vital Signs Last 24 Hrs  T(C): --  T(F): --  HR: 85 (2024 10:13) (85 - 85)  BP: 140/74 (2024 10:13) (140/74 - 140/74)  BP(mean): --  RR: --  SpO2: --        Gen: No acute distress. Awake. Alert  CV: Regular rate and rhythm. No murmurs appreciated  Pulm: Clear to auscultation bilaterally. No wheezes, crackles, or rhonchi  Abd: Soft. Gravid. Non-tender  Extremities: No pitting edema or calf tenderness bilaterally     FHT: 125/mod/(+)accels/(-)decels   [place]: q2-4min  Sono: Vertex    Gen: No acute distress. Awake. Alert  Pulm: Unlabored breathing. No respiratory distress   Abd: Soft. Gravid. Non-tender           [facility]  # [id number]    HPI: Pt is a 40yo  @ 33w4d who presents for care coordination in the setting of recent ultrasound imaging findings concerning for accreta vs. increta on 2024.  Fetal movement (+)  Leakage of fluid (-)  Contractions, denied feeling at time of inital eval (some ctx noted on the monitor however)  Vaginal bleeding (-)  GBS unk  Estimated fetal weight: 2818g,     Prenatal care complicated by: Above  - Patient has a complete previa     - Low risk NIPs  - Nl hr GCT  - Elevated JOCELYN (24.5) w/ EFW 2818g (98%) and AC >99%     OBHx:  - . . 3400g. Uncomplicated. In [facility]  - . . 3600g. Uncomplicated. In [facility]  - . D&C for miscarriage  - 2016. FT. pLTCS for NRFHT. 4262g  GynHx: Denies  PMHx: Denies  PSHx: Above   Med: PNV  All: NKDA  SH: Denies hx of smoking, drinking, or drug usage during the pregnancy    Vital Signs Last 24 Hrs  T(C): --  T(F): --  HR: 85 (2024 10:13) (85 - 85)  BP: 140/74 (2024 10:13) (140/74 - 140/74)  BP(mean): --  RR: --  SpO2: --        Gen: No acute distress. Awake. Alert  CV: Regular rate and rhythm. No murmurs appreciated  Pulm: Clear to auscultation bilaterally. No wheezes, crackles, or rhonchi  Abd: Soft. Gravid. Non-tender  Extremities: No pitting edema or calf tenderness bilaterally     FHT: 125/mod/(+)accels/(-)decels   [place]: q2-4min  Sono: Vertex    Gen: No acute distress. Awake. Alert  Pulm: Unlabored breathing. No respiratory distress   Abd: Soft. Gravid. Non-tender

## 2024-11-14 NOTE — OB RN PATIENT PROFILE - NSNWAMBCLINIC_OBGYN_ALL_OB
[facility] 865 Maternal Fetal Medicine Practice/Other [facility]65 [facility]/Other

## 2024-11-14 NOTE — CONSULT NOTE ADULT - SUBJECTIVE AND OBJECTIVE BOX
Interventional Radiology    HPI: 39y Female 33wkGA presents with concern for placenta accreta with possible invasion into bladder wall. IR consulted for possible intra-operative assistance.    Allergies: No Known Allergies    Medications (Abx/Cardiac/Anticoagulation/Blood Products)      Data:  170.2  93  T(C): 36.9  HR: 78  BP: 124/82  RR: 18  SpO2: 98%    -WBC 10.36 / HgB 10.2 / Hct 30.9 / Plt 305  -Na 139 / Cl 106 / BUN 6 / Glucose 79  -K 3.6 / CO2 18 / Cr 0.48  -ALT 23 / Alk Phos 211 / T.Bili 0.3  -INR -- / PTT 26.6    Radiology:     Assessment/Plan: 39y Female 33wkGA presents with concern for placenta accreta with possible invasion into bladder wall with plans for CS on 11/19/24. IR consulted for possible intra-operative assistance.    - IR is aware and will be available should emergent intervention be necessary. Case discussed with OB, no plans for preoperative catheter placement at this time. If plans change, please call IR.  - Case discussed with Dr. Bobby Willson.  - d/w primary team

## 2024-11-14 NOTE — OB RN PATIENT PROFILE - NSTRANFUSIONOBJECTION_GEN_ALL_CORE_SIUH
Detail Level: Detailed General Sunscreen Counseling: Recommend broad spectrum sunscreen with a SPF of 30 or higher. Sunscreens should be applied at least 15 minutes prior to expected sun exposure and then every 2 hours after that as long as sun exposure continues. If swimming or exercising sunscreen should be reapplied every 45 minutes to an hour after getting wet or sweating.  One ounce, or the equivalent of a shot glass full of sunscreen, is adequate to protect the skin not covered by a bathing suit. Sun protective clothing can be used in lieu of sunscreen but must be worn the entire time you are exposed to the sun's rays. Patient has no objection to blood transfusions.

## 2024-11-14 NOTE — OB PROVIDER H&P - NS_ADMITREASON_OBGYN_ALL_OB
• Baseline creatinine if 0 8 to 1  • Creatinine stable   • Nephrology following, appreciate recommendations  • IV Bumex decreased from twice a day to daily yesterday by nephrology  • Continue midodrine and octreotide per nephrology  • Avoid hypotension and nephrotoxins  • Monitor intake and output  • Daily weights  • Check BMP a m  Other

## 2024-11-14 NOTE — OB PROVIDER H&P - ATTENDING COMMENTS
Obstetrical  8am-6pm:  Patient neither nor and examined by me.  Agree with above resident note. Confirms what was presented to me by Dr. Rolando Marshall prior to her admission.  Liliana MORAN

## 2024-11-15 ENCOUNTER — TRANSCRIPTION ENCOUNTER (OUTPATIENT)
Age: 39
End: 2024-11-15

## 2024-11-15 ENCOUNTER — APPOINTMENT (OUTPATIENT)
Dept: ANTEPARTUM | Facility: CLINIC | Age: 39
End: 2024-11-15

## 2024-11-15 ENCOUNTER — APPOINTMENT (OUTPATIENT)
Dept: GYNECOLOGIC ONCOLOGY | Facility: HOSPITAL | Age: 39
End: 2024-11-15

## 2024-11-15 DIAGNOSIS — R73.9 HYPERGLYCEMIA, UNSPECIFIED: ICD-10-CM

## 2024-11-15 DIAGNOSIS — I10 ESSENTIAL (PRIMARY) HYPERTENSION: ICD-10-CM

## 2024-11-15 DIAGNOSIS — E87.20 ACIDOSIS, UNSPECIFIED: ICD-10-CM

## 2024-11-15 DIAGNOSIS — E11.10 TYPE 2 DIABETES MELLITUS WITH KETOACIDOSIS WITHOUT COMA: ICD-10-CM

## 2024-11-15 PROBLEM — Z78.9 OTHER SPECIFIED HEALTH STATUS: Chronic | Status: ACTIVE | Noted: 2024-11-14

## 2024-11-15 LAB
ADD ON TEST-SPECIMEN IN LAB: SIGNIFICANT CHANGE UP
ADD ON TEST-SPECIMEN IN LAB: SIGNIFICANT CHANGE UP
ALBUMIN SERPL ELPH-MCNC: 2.5 G/DL — LOW (ref 3.3–5)
ALBUMIN SERPL ELPH-MCNC: 2.5 G/DL — LOW (ref 3.3–5)
ALBUMIN SERPL ELPH-MCNC: 2.6 G/DL — LOW (ref 3.3–5)
ALBUMIN SERPL ELPH-MCNC: 2.6 G/DL — LOW (ref 3.3–5)
ALP SERPL-CCNC: 159 U/L — HIGH (ref 40–120)
ALP SERPL-CCNC: 167 U/L — HIGH (ref 40–120)
ALP SERPL-CCNC: 169 U/L — HIGH (ref 40–120)
ALP SERPL-CCNC: 177 U/L — HIGH (ref 40–120)
ALT FLD-CCNC: 14 U/L — SIGNIFICANT CHANGE UP (ref 10–45)
ALT FLD-CCNC: 16 U/L — SIGNIFICANT CHANGE UP (ref 10–45)
ALT FLD-CCNC: 17 U/L — SIGNIFICANT CHANGE UP (ref 10–45)
ALT FLD-CCNC: 19 U/L — SIGNIFICANT CHANGE UP (ref 10–45)
ANION GAP SERPL CALC-SCNC: 12 MMOL/L — SIGNIFICANT CHANGE UP (ref 5–17)
ANION GAP SERPL CALC-SCNC: 16 MMOL/L — SIGNIFICANT CHANGE UP (ref 5–17)
ANION GAP SERPL CALC-SCNC: 18 MMOL/L — HIGH (ref 5–17)
ANION GAP SERPL CALC-SCNC: 21 MMOL/L — HIGH (ref 5–17)
APPEARANCE UR: CLEAR — SIGNIFICANT CHANGE UP
APTT BLD: 24.5 SEC — SIGNIFICANT CHANGE UP (ref 24.5–35.6)
APTT BLD: 25.8 SEC — SIGNIFICANT CHANGE UP (ref 24.5–35.6)
APTT BLD: 26.4 SEC — SIGNIFICANT CHANGE UP (ref 24.5–35.6)
AST SERPL-CCNC: 18 U/L — SIGNIFICANT CHANGE UP (ref 10–40)
AST SERPL-CCNC: 19 U/L — SIGNIFICANT CHANGE UP (ref 10–40)
AST SERPL-CCNC: 20 U/L — SIGNIFICANT CHANGE UP (ref 10–40)
AST SERPL-CCNC: 22 U/L — SIGNIFICANT CHANGE UP (ref 10–40)
B-OH-BUTYR SERPL-SCNC: 0.1 MMOL/L — SIGNIFICANT CHANGE UP
B-OH-BUTYR SERPL-SCNC: 2 MMOL/L — HIGH
B-OH-BUTYR SERPL-SCNC: 4.2 MMOL/L — HIGH
BACTERIA # UR AUTO: NEGATIVE /HPF — SIGNIFICANT CHANGE UP
BASOPHILS # BLD AUTO: 0 K/UL — SIGNIFICANT CHANGE UP (ref 0–0.2)
BASOPHILS # BLD AUTO: 0.02 K/UL — SIGNIFICANT CHANGE UP (ref 0–0.2)
BASOPHILS NFR BLD AUTO: 0 % — SIGNIFICANT CHANGE UP (ref 0–2)
BASOPHILS NFR BLD AUTO: 0.1 % — SIGNIFICANT CHANGE UP (ref 0–2)
BILIRUB SERPL-MCNC: 0.3 MG/DL — SIGNIFICANT CHANGE UP (ref 0.2–1.2)
BILIRUB SERPL-MCNC: 0.3 MG/DL — SIGNIFICANT CHANGE UP (ref 0.2–1.2)
BILIRUB SERPL-MCNC: 0.5 MG/DL — SIGNIFICANT CHANGE UP (ref 0.2–1.2)
BILIRUB SERPL-MCNC: 0.8 MG/DL — SIGNIFICANT CHANGE UP (ref 0.2–1.2)
BILIRUB UR-MCNC: NEGATIVE — SIGNIFICANT CHANGE UP
BUN SERPL-MCNC: 10 MG/DL — SIGNIFICANT CHANGE UP (ref 7–23)
BUN SERPL-MCNC: 8 MG/DL — SIGNIFICANT CHANGE UP (ref 7–23)
BUN SERPL-MCNC: 9 MG/DL — SIGNIFICANT CHANGE UP (ref 7–23)
BUN SERPL-MCNC: 9 MG/DL — SIGNIFICANT CHANGE UP (ref 7–23)
CALCIUM SERPL-MCNC: 7.7 MG/DL — LOW (ref 8.4–10.5)
CALCIUM SERPL-MCNC: 8 MG/DL — LOW (ref 8.4–10.5)
CALCIUM SERPL-MCNC: 8.1 MG/DL — LOW (ref 8.4–10.5)
CALCIUM SERPL-MCNC: 8.2 MG/DL — LOW (ref 8.4–10.5)
CAST: 3 /LPF — SIGNIFICANT CHANGE UP (ref 0–4)
CHLORIDE SERPL-SCNC: 105 MMOL/L — SIGNIFICANT CHANGE UP (ref 96–108)
CHLORIDE SERPL-SCNC: 106 MMOL/L — SIGNIFICANT CHANGE UP (ref 96–108)
CHLORIDE SERPL-SCNC: 106 MMOL/L — SIGNIFICANT CHANGE UP (ref 96–108)
CHLORIDE SERPL-SCNC: 108 MMOL/L — SIGNIFICANT CHANGE UP (ref 96–108)
CO2 SERPL-SCNC: 10 MMOL/L — CRITICAL LOW (ref 22–31)
CO2 SERPL-SCNC: 13 MMOL/L — LOW (ref 22–31)
CO2 SERPL-SCNC: 14 MMOL/L — LOW (ref 22–31)
CO2 SERPL-SCNC: 16 MMOL/L — LOW (ref 22–31)
COLOR SPEC: YELLOW — SIGNIFICANT CHANGE UP
CREAT SERPL-MCNC: 0.48 MG/DL — LOW (ref 0.5–1.3)
CREAT SERPL-MCNC: 0.51 MG/DL — SIGNIFICANT CHANGE UP (ref 0.5–1.3)
CREAT SERPL-MCNC: 0.54 MG/DL — SIGNIFICANT CHANGE UP (ref 0.5–1.3)
CREAT SERPL-MCNC: 0.78 MG/DL — SIGNIFICANT CHANGE UP (ref 0.5–1.3)
DIFF PNL FLD: ABNORMAL
EGFR: 119 ML/MIN/1.73M2 — SIGNIFICANT CHANGE UP
EGFR: 121 ML/MIN/1.73M2 — SIGNIFICANT CHANGE UP
EGFR: 123 ML/MIN/1.73M2 — SIGNIFICANT CHANGE UP
EGFR: 98 ML/MIN/1.73M2 — SIGNIFICANT CHANGE UP
EOSINOPHIL # BLD AUTO: 0 K/UL — SIGNIFICANT CHANGE UP (ref 0–0.5)
EOSINOPHIL # BLD AUTO: 0 K/UL — SIGNIFICANT CHANGE UP (ref 0–0.5)
EOSINOPHIL NFR BLD AUTO: 0 % — SIGNIFICANT CHANGE UP (ref 0–6)
EOSINOPHIL NFR BLD AUTO: 0 % — SIGNIFICANT CHANGE UP (ref 0–6)
FIBRINOGEN PPP-MCNC: 406 MG/DL — SIGNIFICANT CHANGE UP (ref 200–445)
GAS PNL BLDA: SIGNIFICANT CHANGE UP
GIANT PLATELETS BLD QL SMEAR: PRESENT — SIGNIFICANT CHANGE UP
GLUCOSE BLDC GLUCOMTR-MCNC: 125 MG/DL — HIGH (ref 70–99)
GLUCOSE BLDC GLUCOMTR-MCNC: 128 MG/DL — HIGH (ref 70–99)
GLUCOSE BLDC GLUCOMTR-MCNC: 141 MG/DL — HIGH (ref 70–99)
GLUCOSE BLDC GLUCOMTR-MCNC: 145 MG/DL — HIGH (ref 70–99)
GLUCOSE BLDC GLUCOMTR-MCNC: 161 MG/DL — HIGH (ref 70–99)
GLUCOSE BLDC GLUCOMTR-MCNC: 167 MG/DL — HIGH (ref 70–99)
GLUCOSE BLDC GLUCOMTR-MCNC: 168 MG/DL — HIGH (ref 70–99)
GLUCOSE BLDC GLUCOMTR-MCNC: 172 MG/DL — HIGH (ref 70–99)
GLUCOSE BLDC GLUCOMTR-MCNC: 178 MG/DL — HIGH (ref 70–99)
GLUCOSE BLDC GLUCOMTR-MCNC: 185 MG/DL — HIGH (ref 70–99)
GLUCOSE BLDC GLUCOMTR-MCNC: 186 MG/DL — HIGH (ref 70–99)
GLUCOSE BLDC GLUCOMTR-MCNC: 190 MG/DL — HIGH (ref 70–99)
GLUCOSE BLDC GLUCOMTR-MCNC: 207 MG/DL — HIGH (ref 70–99)
GLUCOSE SERPL-MCNC: 152 MG/DL — HIGH (ref 70–99)
GLUCOSE SERPL-MCNC: 160 MG/DL — HIGH (ref 70–99)
GLUCOSE SERPL-MCNC: 179 MG/DL — HIGH (ref 70–99)
GLUCOSE SERPL-MCNC: 197 MG/DL — HIGH (ref 70–99)
GLUCOSE UR QL: NEGATIVE MG/DL — SIGNIFICANT CHANGE UP
HCT VFR BLD CALC: 26.2 % — LOW (ref 34.5–45)
HCT VFR BLD CALC: 28.3 % — LOW (ref 34.5–45)
HCT VFR BLD CALC: 30.3 % — LOW (ref 34.5–45)
HCT VFR BLD CALC: 31.6 % — LOW (ref 34.5–45)
HCT VFR BLD CALC: 34.1 % — LOW (ref 34.5–45)
HCT VFR BLD CALC: 34.2 % — LOW (ref 34.5–45)
HGB BLD-MCNC: 10.1 G/DL — LOW (ref 11.5–15.5)
HGB BLD-MCNC: 10.5 G/DL — LOW (ref 11.5–15.5)
HGB BLD-MCNC: 10.9 G/DL — LOW (ref 11.5–15.5)
HGB BLD-MCNC: 11.2 G/DL — LOW (ref 11.5–15.5)
HGB BLD-MCNC: 8.5 G/DL — LOW (ref 11.5–15.5)
HGB BLD-MCNC: 9.3 G/DL — LOW (ref 11.5–15.5)
IMM GRANULOCYTES NFR BLD AUTO: 1.1 % — HIGH (ref 0–0.9)
INR BLD: 0.88 RATIO — SIGNIFICANT CHANGE UP (ref 0.85–1.16)
INR BLD: 0.89 RATIO — SIGNIFICANT CHANGE UP (ref 0.85–1.16)
INR BLD: 0.91 RATIO — SIGNIFICANT CHANGE UP (ref 0.85–1.16)
KETONES UR-MCNC: >=160 MG/DL
LACTATE SERPL-SCNC: 1.4 MMOL/L — SIGNIFICANT CHANGE UP (ref 0.5–2)
LEUKOCYTE ESTERASE UR-ACNC: NEGATIVE — SIGNIFICANT CHANGE UP
LYMPHOCYTES # BLD AUTO: 1.01 K/UL — SIGNIFICANT CHANGE UP (ref 1–3.3)
LYMPHOCYTES # BLD AUTO: 1.51 K/UL — SIGNIFICANT CHANGE UP (ref 1–3.3)
LYMPHOCYTES # BLD AUTO: 6 % — LOW (ref 13–44)
LYMPHOCYTES # BLD AUTO: 9.3 % — LOW (ref 13–44)
MAGNESIUM SERPL-MCNC: 2.2 MG/DL — SIGNIFICANT CHANGE UP (ref 1.6–2.6)
MAGNESIUM SERPL-MCNC: 2.4 MG/DL — SIGNIFICANT CHANGE UP (ref 1.6–2.6)
MAGNESIUM SERPL-MCNC: 2.8 MG/DL — HIGH (ref 1.6–2.6)
MAGNESIUM SERPL-MCNC: 4.8 MG/DL — HIGH (ref 1.6–2.6)
MANUAL SMEAR VERIFICATION: SIGNIFICANT CHANGE UP
MCHC RBC-ENTMCNC: 28.6 PG — SIGNIFICANT CHANGE UP (ref 27–34)
MCHC RBC-ENTMCNC: 29.1 PG — SIGNIFICANT CHANGE UP (ref 27–34)
MCHC RBC-ENTMCNC: 29.3 PG — SIGNIFICANT CHANGE UP (ref 27–34)
MCHC RBC-ENTMCNC: 29.9 PG — SIGNIFICANT CHANGE UP (ref 27–34)
MCHC RBC-ENTMCNC: 29.9 PG — SIGNIFICANT CHANGE UP (ref 27–34)
MCHC RBC-ENTMCNC: 30 PG — SIGNIFICANT CHANGE UP (ref 27–34)
MCHC RBC-ENTMCNC: 32 G/DL — SIGNIFICANT CHANGE UP (ref 32–36)
MCHC RBC-ENTMCNC: 32.4 G/DL — SIGNIFICANT CHANGE UP (ref 32–36)
MCHC RBC-ENTMCNC: 32.7 G/DL — SIGNIFICANT CHANGE UP (ref 32–36)
MCHC RBC-ENTMCNC: 32.9 G/DL — SIGNIFICANT CHANGE UP (ref 32–36)
MCHC RBC-ENTMCNC: 33.2 G/DL — SIGNIFICANT CHANGE UP (ref 32–36)
MCHC RBC-ENTMCNC: 33.3 G/DL — SIGNIFICANT CHANGE UP (ref 32–36)
MCV RBC AUTO: 87.1 FL — SIGNIFICANT CHANGE UP (ref 80–100)
MCV RBC AUTO: 87.8 FL — SIGNIFICANT CHANGE UP (ref 80–100)
MCV RBC AUTO: 90.3 FL — SIGNIFICANT CHANGE UP (ref 80–100)
MCV RBC AUTO: 90.9 FL — SIGNIFICANT CHANGE UP (ref 80–100)
MCV RBC AUTO: 91.2 FL — SIGNIFICANT CHANGE UP (ref 80–100)
MCV RBC AUTO: 92.3 FL — SIGNIFICANT CHANGE UP (ref 80–100)
MONOCYTES # BLD AUTO: 0.13 K/UL — SIGNIFICANT CHANGE UP (ref 0–0.9)
MONOCYTES # BLD AUTO: 0.33 K/UL — SIGNIFICANT CHANGE UP (ref 0–0.9)
MONOCYTES NFR BLD AUTO: 0.8 % — LOW (ref 2–14)
MONOCYTES NFR BLD AUTO: 2 % — SIGNIFICANT CHANGE UP (ref 2–14)
MYELOCYTES NFR BLD: 0.9 % — HIGH (ref 0–0)
NEUTROPHILS # BLD AUTO: 14.14 K/UL — HIGH (ref 1.8–7.4)
NEUTROPHILS # BLD AUTO: 15.57 K/UL — HIGH (ref 1.8–7.4)
NEUTROPHILS NFR BLD AUTO: 87.2 % — HIGH (ref 43–77)
NEUTROPHILS NFR BLD AUTO: 87.5 % — HIGH (ref 43–77)
NEUTS BAND # BLD: 5.1 % — SIGNIFICANT CHANGE UP (ref 0–8)
NITRITE UR-MCNC: NEGATIVE — SIGNIFICANT CHANGE UP
NRBC # BLD: 0 /100 WBCS — SIGNIFICANT CHANGE UP (ref 0–0)
PH UR: 5.5 — SIGNIFICANT CHANGE UP (ref 5–8)
PHOSPHATE SERPL-MCNC: 3.3 MG/DL — SIGNIFICANT CHANGE UP (ref 2.5–4.5)
PHOSPHATE SERPL-MCNC: 3.9 MG/DL — SIGNIFICANT CHANGE UP (ref 2.5–4.5)
PHOSPHATE SERPL-MCNC: 4.8 MG/DL — HIGH (ref 2.5–4.5)
PLAT MORPH BLD: NORMAL — SIGNIFICANT CHANGE UP
PLATELET # BLD AUTO: 266 K/UL — SIGNIFICANT CHANGE UP (ref 150–400)
PLATELET # BLD AUTO: 267 K/UL — SIGNIFICANT CHANGE UP (ref 150–400)
PLATELET # BLD AUTO: 285 K/UL — SIGNIFICANT CHANGE UP (ref 150–400)
PLATELET # BLD AUTO: 295 K/UL — SIGNIFICANT CHANGE UP (ref 150–400)
PLATELET # BLD AUTO: 298 K/UL — SIGNIFICANT CHANGE UP (ref 150–400)
PLATELET # BLD AUTO: 373 K/UL — SIGNIFICANT CHANGE UP (ref 150–400)
POLYCHROMASIA BLD QL SMEAR: SLIGHT — SIGNIFICANT CHANGE UP
POTASSIUM SERPL-MCNC: 4.2 MMOL/L — SIGNIFICANT CHANGE UP (ref 3.5–5.3)
POTASSIUM SERPL-MCNC: 4.3 MMOL/L — SIGNIFICANT CHANGE UP (ref 3.5–5.3)
POTASSIUM SERPL-MCNC: 4.4 MMOL/L — SIGNIFICANT CHANGE UP (ref 3.5–5.3)
POTASSIUM SERPL-MCNC: 4.4 MMOL/L — SIGNIFICANT CHANGE UP (ref 3.5–5.3)
POTASSIUM SERPL-SCNC: 4.2 MMOL/L — SIGNIFICANT CHANGE UP (ref 3.5–5.3)
POTASSIUM SERPL-SCNC: 4.3 MMOL/L — SIGNIFICANT CHANGE UP (ref 3.5–5.3)
POTASSIUM SERPL-SCNC: 4.4 MMOL/L — SIGNIFICANT CHANGE UP (ref 3.5–5.3)
POTASSIUM SERPL-SCNC: 4.4 MMOL/L — SIGNIFICANT CHANGE UP (ref 3.5–5.3)
PROT SERPL-MCNC: 5.2 G/DL — LOW (ref 6–8.3)
PROT SERPL-MCNC: 5.2 G/DL — LOW (ref 6–8.3)
PROT SERPL-MCNC: 5.4 G/DL — LOW (ref 6–8.3)
PROT SERPL-MCNC: 5.6 G/DL — LOW (ref 6–8.3)
PROT UR-MCNC: 30 MG/DL
PROTHROM AB SERPL-ACNC: 10.1 SEC — SIGNIFICANT CHANGE UP (ref 9.9–13.4)
PROTHROM AB SERPL-ACNC: 10.2 SEC — SIGNIFICANT CHANGE UP (ref 9.9–13.4)
PROTHROM AB SERPL-ACNC: 10.5 SEC — SIGNIFICANT CHANGE UP (ref 9.9–13.4)
RAPIDTEG MAXIMUM AMPLITUDE: 68.4 MM — SIGNIFICANT CHANGE UP (ref 52–70)
RBC # BLD: 2.84 M/UL — LOW (ref 3.8–5.2)
RBC # BLD: 3.25 M/UL — LOW (ref 3.8–5.2)
RBC # BLD: 3.45 M/UL — LOW (ref 3.8–5.2)
RBC # BLD: 3.5 M/UL — LOW (ref 3.8–5.2)
RBC # BLD: 3.75 M/UL — LOW (ref 3.8–5.2)
RBC # BLD: 3.75 M/UL — LOW (ref 3.8–5.2)
RBC # FLD: 13.2 % — SIGNIFICANT CHANGE UP (ref 10.3–14.5)
RBC # FLD: 13.3 % — SIGNIFICANT CHANGE UP (ref 10.3–14.5)
RBC # FLD: 13.8 % — SIGNIFICANT CHANGE UP (ref 10.3–14.5)
RBC # FLD: 14.2 % — SIGNIFICANT CHANGE UP (ref 10.3–14.5)
RBC # FLD: 14.2 % — SIGNIFICANT CHANGE UP (ref 10.3–14.5)
RBC # FLD: 14.3 % — SIGNIFICANT CHANGE UP (ref 10.3–14.5)
RBC BLD AUTO: SIGNIFICANT CHANGE UP
RBC CASTS # UR COMP ASSIST: 257 /HPF — HIGH (ref 0–4)
SODIUM SERPL-SCNC: 135 MMOL/L — SIGNIFICANT CHANGE UP (ref 135–145)
SODIUM SERPL-SCNC: 136 MMOL/L — SIGNIFICANT CHANGE UP (ref 135–145)
SODIUM SERPL-SCNC: 137 MMOL/L — SIGNIFICANT CHANGE UP (ref 135–145)
SODIUM SERPL-SCNC: 137 MMOL/L — SIGNIFICANT CHANGE UP (ref 135–145)
SP GR SPEC: 1.02 — SIGNIFICANT CHANGE UP (ref 1–1.03)
SQUAMOUS # UR AUTO: 1 /HPF — SIGNIFICANT CHANGE UP (ref 0–5)
T PALLIDUM AB TITR SER: NEGATIVE — SIGNIFICANT CHANGE UP
TEG FUNCTIONAL FIBRINOGEN: 26.6 MM — SIGNIFICANT CHANGE UP (ref 15–32)
TEG LY30 (LYSIS): 0 % — SIGNIFICANT CHANGE UP (ref 0–2.6)
TEG REACTION TIME: 4.6 MIN — SIGNIFICANT CHANGE UP (ref 4.6–9.1)
UROBILINOGEN FLD QL: 0.2 MG/DL — SIGNIFICANT CHANGE UP (ref 0.2–1)
WBC # BLD: 16.18 K/UL — HIGH (ref 3.8–10.5)
WBC # BLD: 16.87 K/UL — HIGH (ref 3.8–10.5)
WBC # BLD: 18.73 K/UL — HIGH (ref 3.8–10.5)
WBC # BLD: 18.81 K/UL — HIGH (ref 3.8–10.5)
WBC # BLD: 21.76 K/UL — HIGH (ref 3.8–10.5)
WBC # BLD: 24.46 K/UL — HIGH (ref 3.8–10.5)
WBC # FLD AUTO: 16.18 K/UL — HIGH (ref 3.8–10.5)
WBC # FLD AUTO: 16.87 K/UL — HIGH (ref 3.8–10.5)
WBC # FLD AUTO: 18.73 K/UL — HIGH (ref 3.8–10.5)
WBC # FLD AUTO: 18.81 K/UL — HIGH (ref 3.8–10.5)
WBC # FLD AUTO: 21.76 K/UL — HIGH (ref 3.8–10.5)
WBC # FLD AUTO: 24.46 K/UL — HIGH (ref 3.8–10.5)
WBC UR QL: 4 /HPF — SIGNIFICANT CHANGE UP (ref 0–5)

## 2024-11-15 PROCEDURE — 88302 TISSUE EXAM BY PATHOLOGIST: CPT | Mod: 26

## 2024-11-15 PROCEDURE — 88307 TISSUE EXAM BY PATHOLOGIST: CPT | Mod: 26

## 2024-11-15 PROCEDURE — 59514 CESAREAN DELIVERY ONLY: CPT | Mod: U7

## 2024-11-15 PROCEDURE — 99254 IP/OBS CNSLTJ NEW/EST MOD 60: CPT | Mod: 25

## 2024-11-15 PROCEDURE — 76937 US GUIDE VASCULAR ACCESS: CPT | Mod: 26

## 2024-11-15 PROCEDURE — 58150 TOTAL HYSTERECTOMY: CPT | Mod: 80

## 2024-11-15 PROCEDURE — 36620 INSERTION CATHETER ARTERY: CPT

## 2024-11-15 PROCEDURE — 74018 RADEX ABDOMEN 1 VIEW: CPT | Mod: 26

## 2024-11-15 PROCEDURE — 99254 IP/OBS CNSLTJ NEW/EST MOD 60: CPT | Mod: GC

## 2024-11-15 PROCEDURE — 58150 TOTAL HYSTERECTOMY: CPT | Mod: 22

## 2024-11-15 DEVICE — IMPLANTABLE DEVICE: Type: IMPLANTABLE DEVICE | Status: FUNCTIONAL

## 2024-11-15 DEVICE — GUIDEWIRE SENSOR DUAL-FLEX NITINOL STRAIGHT .035" X 150CM: Type: IMPLANTABLE DEVICE | Status: FUNCTIONAL

## 2024-11-15 DEVICE — KIT A-LINE 1LUM 20G X 12CM SAFE KIT: Type: IMPLANTABLE DEVICE | Status: FUNCTIONAL

## 2024-11-15 DEVICE — SURGICEL NU-KNIT 6 X 9": Type: IMPLANTABLE DEVICE | Status: FUNCTIONAL

## 2024-11-15 DEVICE — VISTASEAL FIBRIN HUMAN 10ML: Type: IMPLANTABLE DEVICE | Status: FUNCTIONAL

## 2024-11-15 DEVICE — URETERAL CATH OPEN END 5FR 70CM: Type: IMPLANTABLE DEVICE | Status: FUNCTIONAL

## 2024-11-15 RX ORDER — ACETAMINOPHEN 500MG 500 MG/1
1000 TABLET, COATED ORAL EVERY 6 HOURS
Refills: 0 | Status: COMPLETED | OUTPATIENT
Start: 2024-11-15 | End: 2024-11-16

## 2024-11-15 RX ORDER — INSULIN REG, HUM S-S BUFF 100/ML
4 VIAL (ML) INJECTION
Qty: 100 | Refills: 0 | Status: DISCONTINUED | OUTPATIENT
Start: 2024-11-15 | End: 2024-11-15

## 2024-11-15 RX ORDER — CHLORHEXIDINE GLUCONATE 1.2 MG/ML
1 RINSE ORAL
Refills: 0 | Status: DISCONTINUED | OUTPATIENT
Start: 2024-11-15 | End: 2024-11-19

## 2024-11-15 RX ORDER — SODIUM BICARBONATE 84 MG/ML
0.16 INJECTION, SOLUTION INTRAVENOUS
Qty: 150 | Refills: 0 | Status: DISCONTINUED | OUTPATIENT
Start: 2024-11-15 | End: 2024-11-15

## 2024-11-15 RX ORDER — HYDROMORPHONE HYDROCHLORIDE 2 MG/1
0.25 TABLET ORAL ONCE
Refills: 0 | Status: DISCONTINUED | OUTPATIENT
Start: 2024-11-15 | End: 2024-11-15

## 2024-11-15 RX ORDER — SODIUM BICARBONATE 84 MG/ML
0.2 INJECTION, SOLUTION INTRAVENOUS
Qty: 150 | Refills: 0 | Status: DISCONTINUED | OUTPATIENT
Start: 2024-11-15 | End: 2024-11-16

## 2024-11-15 RX ORDER — HYDROMORPHONE HYDROCHLORIDE 2 MG/1
0.5 TABLET ORAL
Refills: 0 | Status: DISCONTINUED | OUTPATIENT
Start: 2024-11-15 | End: 2024-11-16

## 2024-11-15 RX ORDER — OXYCODONE HYDROCHLORIDE 30 MG/1
5 TABLET ORAL ONCE
Refills: 0 | Status: DISCONTINUED | OUTPATIENT
Start: 2024-11-15 | End: 2024-11-15

## 2024-11-15 RX ORDER — ENOXAPARIN SODIUM 30 MG/.3ML
40 INJECTION SUBCUTANEOUS EVERY 24 HOURS
Refills: 0 | Status: DISCONTINUED | OUTPATIENT
Start: 2024-11-16 | End: 2024-11-19

## 2024-11-15 RX ORDER — 0.9 % SODIUM CHLORIDE 0.9 %
1000 INTRAVENOUS SOLUTION INTRAVENOUS
Refills: 0 | Status: DISCONTINUED | OUTPATIENT
Start: 2024-11-15 | End: 2024-11-15

## 2024-11-15 RX ADMIN — Medication 12 MILLIGRAM(S): at 02:42

## 2024-11-15 RX ADMIN — SODIUM BICARBONATE 125 MEQ/KG/HR: 84 INJECTION, SOLUTION INTRAVENOUS at 21:05

## 2024-11-15 RX ADMIN — HYDROMORPHONE HYDROCHLORIDE 0.25 MILLIGRAM(S): 2 TABLET ORAL at 15:45

## 2024-11-15 RX ADMIN — Medication 4: at 23:28

## 2024-11-15 RX ADMIN — Medication 2: at 12:05

## 2024-11-15 RX ADMIN — ACETAMINOPHEN 500MG 400 MILLIGRAM(S): 500 TABLET, COATED ORAL at 11:57

## 2024-11-15 RX ADMIN — Medication 50 GM/HR: at 07:29

## 2024-11-15 RX ADMIN — HYDROMORPHONE HYDROCHLORIDE 0.5 MILLIGRAM(S): 2 TABLET ORAL at 23:45

## 2024-11-15 RX ADMIN — OXYCODONE HYDROCHLORIDE 5 MILLIGRAM(S): 30 TABLET ORAL at 19:45

## 2024-11-15 RX ADMIN — Medication 125 MILLILITER(S): at 19:52

## 2024-11-15 RX ADMIN — HYDROMORPHONE HYDROCHLORIDE 0.25 MILLIGRAM(S): 2 TABLET ORAL at 15:11

## 2024-11-15 RX ADMIN — ACETAMINOPHEN 500MG 1000 MILLIGRAM(S): 500 TABLET, COATED ORAL at 23:30

## 2024-11-15 RX ADMIN — OXYCODONE HYDROCHLORIDE 5 MILLIGRAM(S): 30 TABLET ORAL at 20:15

## 2024-11-15 RX ADMIN — ACETAMINOPHEN 500MG 400 MILLIGRAM(S): 500 TABLET, COATED ORAL at 23:00

## 2024-11-15 RX ADMIN — Medication 100 MILLILITER(S): at 11:57

## 2024-11-15 RX ADMIN — CHLORHEXIDINE GLUCONATE 1 APPLICATION(S): 1.2 RINSE ORAL at 11:57

## 2024-11-15 RX ADMIN — Medication 125 MILLILITER(S): at 13:52

## 2024-11-15 RX ADMIN — Medication 4 UNIT(S)/HR: at 14:16

## 2024-11-15 RX ADMIN — HYDROMORPHONE HYDROCHLORIDE 0.5 MILLIGRAM(S): 2 TABLET ORAL at 23:30

## 2024-11-15 RX ADMIN — Medication 4 UNIT(S)/HR: at 19:52

## 2024-11-15 RX ADMIN — ACETAMINOPHEN 500MG 400 MILLIGRAM(S): 500 TABLET, COATED ORAL at 17:17

## 2024-11-15 RX ADMIN — Medication 8 UNIT(S)/HR: at 13:53

## 2024-11-15 NOTE — CONSULT NOTE ADULT - ASSESSMENT
39F  @ 33w [facility] last pregnancy p/w concern for placenta percreta invading cervix and bladder; high risk for hemorrhage intraop. OBGYN planning for  hysterectomy 11/15 AM for which they are consulting ACS for preoperative REBOA placement given very high risk of bleeding.    Plan:   - Discussed r/b/a and consented (signed and in chart) for REBOA placement preop 11/15 AM with [employer]  in coordination with OBGYN and urology.   - ACS available for emergent or planned REBOA placement   - Rest of care per primary team    Patient seen with Dr. Mike Alvarado PGY-2  Trauma/ACS   [phone]

## 2024-11-15 NOTE — OB PROVIDER DELIVERY SUMMARY - NSSELHIDDEN_OBGYN_ALL_OB_FT
[NS_DeliveryAttending1_OBGYN_ALL_OB_FT:[id number]],[NS_DeliveryAttending2_OBGYN_ALL_OB_FT:[id number]],[NS_DeliveryAssist1_OBGYN_ALL_OB_FT:[id number]]

## 2024-11-15 NOTE — PRE-ANESTHESIA EVALUATION ADULT - NSANTHADDINFOFT_GEN_ALL_CORE
case discussed with OB, patient at high risk for bleeding and potential for hysterectomy. In light of this high risk will consider general anesthesia.
Rivka Xie

## 2024-11-15 NOTE — CHART NOTE - NSCHARTNOTEFT_GEN_A_CORE
POST-OPERATIVE NOTE    Subjective:  Patient is s/p arterial cannulation for resuscitative endovascular balloon occlusion of aorta. Recovering appropriately.     Vital Signs Last 24 Hrs  T(C): 36.6 (15 Nov 2024 15:00), Max: 37 (15 Nov 2024 11:00)  T(F): 97.8 (15 Nov 2024 15:00), Max: 98.6 (15 Nov 2024 11:00)  HR: 88 (15 Nov 2024 15:15) (80 - 127)  BP: 126/76 (15 Nov 2024 11:00) (100/52 - 152/77)  BP(mean): 97 (15 Nov 2024 11:00) (97 - 97)  RR: 15 (15 Nov 2024 15:15) (12 - 25)  SpO2: 96% (15 Nov 2024 15:15) (87% - 100%)    Parameters below as of 15 Nov 2024 15:00  Patient On (Oxygen Delivery Method): room air      I&O's Detail    14 Nov 2024 07:01  -  15 Nov 2024 07:00  --------------------------------------------------------  IN:    Lactated Ringers: 605 mL    Magnesium Sulfate: 590 mL  Total IN: 1195 mL    OUT:    Blood Loss (mL): 129 mL    Voided (mL): 900 mL  Total OUT: 1029 mL    Total NET: 166 mL      15 Nov 2024 07:01  -  15 Nov 2024 15:35  --------------------------------------------------------  IN:    dextrose 5% + sodium chloride 0.45%: 125 mL    Insulin: 4 mL    Insulin: 4 mL  Total IN: 133 mL    OUT:    Sodium Bicarbonate: 0 mL    Voided (mL): 945 mL  Total OUT: 945 mL    Total NET: -812 mL          PAST MEDICAL & SURGICAL HISTORY:  No pertinent past medical history      No significant past surgical history            Physical Exam:  General: NAD, resting comfortably in bed  Pulmonary: Nonlabored breathing, no respiratory distress  Groin: L groin soft and nontender to palpation, no palpable masses or skin changes.   Abdominal: soft, nondistended, appropriately tender to palpation, abd binder and surgical dressing in place.   Extremities: WWP      LABS:                        10.9   18.81 )-----------( 267      ( 15 Nov 2024 10:50 )             34.1     11-15    137  |  106  |  8   ----------------------------<  179[H]  4.4   |  10[LL]  |  0.48[L]    Ca    8.2[L]      15 Nov 2024 10:50  Phos  4.8     11-15  Mg     2.8     11-15    TPro  5.6[L]  /  Alb  2.6[L]  /  TBili  0.8  /  DBili  x   /  AST  22  /  ALT  19  /  AlkPhos  177[H]  11-15    PT/INR - ( 15 Nov 2024 10:50 )   PT: 10.5 sec;   INR: 0.91 ratio         PTT - ( 15 Nov 2024 10:50 )  PTT:25.8 sec  CAPILLARY BLOOD GLUCOSE      POCT Blood Glucose.: 168 mg/dL (15 Nov 2024 15:04)  POCT Blood Glucose.: 186 mg/dL (15 Nov 2024 13:55)  POCT Blood Glucose.: 185 mg/dL (15 Nov 2024 12:03)  POCT Blood Glucose.: 125 mg/dL (15 Nov 2024 04:03)  POCT Blood Glucose.: 128 mg/dL (15 Nov 2024 02:37)  POCT Blood Glucose.: 109 mg/dL (14 Nov 2024 22:20)      Radiology and Additional Studies:    Assessment:  The patient is a 39y Female who is now several hours post-op from a     Plan:  - Pain control as needed  - Global care per primary    Trauma/ACS [id number] POST-OPERATIVE NOTE    [employer]  ID #[id number]    Subjective:  Patient is s/p arterial cannulation for resuscitative endovascular balloon occlusion of aorta. Recovering appropriately.     Vital Signs Last 24 Hrs  T(C): 36.6 (15 Nov 2024 15:00), Max: 37 (15 Nov 2024 11:00)  T(F): 97.8 (15 Nov 2024 15:00), Max: 98.6 (15 Nov 2024 11:00)  HR: 88 (15 Nov 2024 15:15) (80 - 127)  BP: 126/76 (15 Nov 2024 11:00) (100/52 - 152/77)  BP(mean): 97 (15 Nov 2024 11:00) (97 - 97)  RR: 15 (15 Nov 2024 15:15) (12 - 25)  SpO2: 96% (15 Nov 2024 15:15) (87% - 100%)    Parameters below as of 15 Nov 2024 15:00  Patient On (Oxygen Delivery Method): room air      I&O's Detail    14 Nov 2024 07:01  -  15 Nov 2024 07:00  --------------------------------------------------------  IN:    Lactated Ringers: 605 mL    Magnesium Sulfate: 590 mL  Total IN: 1195 mL    OUT:    Blood Loss (mL): 129 mL    Voided (mL): 900 mL  Total OUT: 1029 mL    Total NET: 166 mL      15 Nov 2024 07:01  -  15 Nov 2024 15:35  --------------------------------------------------------  IN:    dextrose 5% + sodium chloride 0.45%: 125 mL    Insulin: 4 mL    Insulin: 4 mL  Total IN: 133 mL    OUT:    Sodium Bicarbonate: 0 mL    Voided (mL): 945 mL  Total OUT: 945 mL    Total NET: -812 mL          PAST MEDICAL & SURGICAL HISTORY:  No pertinent past medical history      No significant past surgical history            Physical Exam:  General: NAD, resting comfortably in bed  Pulmonary: Nonlabored breathing, no respiratory distress  Groin: L groin soft and nontender to palpation, no palpable masses or skin changes.   Abdominal: soft, nondistended, appropriately tender to palpation, abd binder and surgical dressing in place.   Extremities: WWP      LABS:                        10.9   18.81 )-----------( 267      ( 15 Nov 2024 10:50 )             34.1     11-15    137  |  106  |  8   ----------------------------<  179[H]  4.4   |  10[LL]  |  0.48[L]    Ca    8.2[L]      15 Nov 2024 10:50  Phos  4.8     11-15  Mg     2.8     11-15    TPro  5.6[L]  /  Alb  2.6[L]  /  TBili  0.8  /  DBili  x   /  AST  22  /  ALT  19  /  AlkPhos  177[H]  11-15    PT/INR - ( 15 Nov 2024 10:50 )   PT: 10.5 sec;   INR: 0.91 ratio         PTT - ( 15 Nov 2024 10:50 )  PTT:25.8 sec  CAPILLARY BLOOD GLUCOSE      POCT Blood Glucose.: 168 mg/dL (15 Nov 2024 15:04)  POCT Blood Glucose.: 186 mg/dL (15 Nov 2024 13:55)  POCT Blood Glucose.: 185 mg/dL (15 Nov 2024 12:03)  POCT Blood Glucose.: 125 mg/dL (15 Nov 2024 04:03)  POCT Blood Glucose.: 128 mg/dL (15 Nov 2024 02:37)  POCT Blood Glucose.: 109 mg/dL (14 Nov 2024 22:20)      Radiology and Additional Studies:    Assessment:  The patient is a 39y Female who is now several hours post-op from a     Plan:  - Pain control as needed  - Global care per primary    Trauma/ACS [id number]

## 2024-11-15 NOTE — CONSULT NOTE ADULT - ATTENDING COMMENTS
seen and examined 11-15-24 @ 0145    LMP - 3/25/2024  placenta percreta with MRI suspicious for cervix and bladder invasion    3rd trimester gravid uterus  bilateral femoral pulses palpable      placenta percreta  planned for cesarian hysterectomy today with significant risk of intraoperative pelvic hemorrhage  -The risks (bleeding, femoral artery injury with acute limb ischemia), benefits and alternatives of balloon occlusion of the aorta were discussed with the patient using the [employer]  phone. Written informed consent was obtained for the procedure. seen and examined 11-15-24 @ 0145    LMP - 3/25/2024  placenta percreta with MRI suspicious for cervix and bladder invasion    3rd trimester gravid uterus  bilateral femoral pulses palpable    MRI pelvis w/o contrast - placenta previa with concern for placenta increta involving cervix and possible percreta (I reviewed the radiologic images)      placenta previa with possible percreta  planned for cesarian hysterectomy today with significant risk of intraoperative pelvic hemorrhage  -The risks (bleeding, femoral artery injury with acute limb ischemia), benefits and alternatives of balloon occlusion of the aorta were discussed with the patient using the [employer]  phone. Written informed consent was obtained for the procedure. seen and examined 11-15-24 @ 0145    LMP - 3/25/2024  placenta percreta with MRI suspicious for cervix and bladder invasion    3rd trimester gravid uterus  bilateral femoral pulses palpable    hgb - 10.7 g/dL  plats - 329  INR - 0.8  ptt - 27.3 sec      MRI pelvis w/o contrast - placenta previa with concern for placenta increta involving cervix and possible percreta (I reviewed the radiologic images)      placenta previa with possible percreta  planned for cesarian hysterectomy today with significant risk of intraoperative pelvic hemorrhage  -The risks (bleeding, femoral artery injury with acute limb ischemia), benefits and alternatives of balloon occlusion of the aorta were discussed with the patient using the [employer]  phone. Written informed consent was obtained for the procedure.

## 2024-11-15 NOTE — OB RN DELIVERY SUMMARY - NSCSDELIVASCHE_OBGYN_ALL_OB
[address] [place] [address]               [phone]      Josh Baron is a 71 y.o. male and presents with Follow-up (X ray)       Assessment/Plan:    1. Type 2 diabetes mellitus without complication, without long-term current use of insulin (HCC)  Assessment & Plan:   Well-controlled, continue current medications; last A1c 6.4%   Orders:  -     metFORMIN (GLUCOPHAGE) 500 MG tablet; Take 1 tablet by mouth 2 times daily, Disp-180 tablet, R-1Normal  -     Hemoglobin A1C; Future  -     External Referral To Ophthalmology  2. Essential hypertension  Assessment & Plan:   Well-controlled, continue current medications  Orders:  -     amLODIPine (NORVASC) 10 MG tablet; Take 1 tablet by mouth daily, Disp-90 tablet, R-1Normal  3. History of CVA (cerebrovascular accident)  Assessment & Plan:    Continue statin and aspirin  4. Gastroesophageal reflux disease without esophagitis  -     omeprazole (PRILOSEC) 20 MG delayed release capsule; Take 1 capsule by mouth every morning (before breakfast), Disp-90 capsule, R-1Normal  5. High cholesterol  Assessment & Plan:   Unclear control, continue current medications; recheck lipid panel 8/2024  6. Type 2 diabetes mellitus without complication, without long-term current use of insulin (HCC)  Comments:  new diagnosis; will start Metformin and diet control   Assessment & Plan:   Well-controlled, continue current medications; last A1c 6.4%   Orders:  -     metFORMIN (GLUCOPHAGE) 500 MG tablet; Take 1 tablet by mouth 2 times daily, Disp-180 tablet, R-1Normal  -     Hemoglobin A1C; Future  -     External Referral To Ophthalmology  7. Halitosis  Assessment & Plan:   Well-controlled, continue current medications         Follow up and disposition:   Return in about 3 months (around 8/2/2024) for diabetes.      Subjective:    Labs obtained prior to visit? No  Reviewed with patient? N/A    Colonoscopy scheduled for 5/22/2024     [employer] interpretor utilized   Unscheduled

## 2024-11-15 NOTE — OB RN DELIVERY SUMMARY - BABY A: WEIGHT IN OUNCES (FROM GRAMS), DELIVERY
69 y/o F w/ PMHx of asthma, HTN, Afib, CAD, DM2, depression, and subdural hemorrhage s/p bakari hole p/w difficulty urinating and abdominal distension, with UTI. 3

## 2024-11-15 NOTE — CONSULT NOTE ADULT - ATTENDING COMMENTS
Agree with assessment and plan as above by Dr. Armstrong. Reviewed all pertinent labs, glucose values, and imaging studies. Modifications made as indicated above. Evaluating this 38 y/o F now postpartum with metabolic acidosis likely euglycemic dka from starvation ketosis. Recommend correction of acidosis with dextrose IVF, insulin gtt and po intake. Trend AG and bicarb. Can d/c gtt once bicarb > 18 and AG improved. Optimize nutrition status. Will continue to monitor.     Colten Carrera D.O  [phone]

## 2024-11-15 NOTE — CHART NOTE - NSCHARTNOTEFT_GEN_A_CORE
[facility] POST-OP CHECK    Note delayed 2/2 clinical duties. Patient seen at 1pm  Dianelys  ID# [id number]    S: Patient seen and evaluated at bedside.  Pt awake and alert resting comfortably in bed.  Patient reports pain controlled with analgesia. Pt denies N/V, SOB, CP, palpitations, fever/chills. Tolerating clears.  Not OOB yet.    O:   T(C): 36.6 (11-15-24 @ 15:00), Max: 36.6 (11-15-24 @ 15:00)  HR: 88 (11-15-24 @ 15:15) (81 - 89)  BP: --  RR: 15 (11-15-24 @ 15:15) (12 - 18)  SpO2: 96% (11-15-24 @ 15:15) (95% - 98%)  Wt(kg): --  I&O's Summary    2024 07:  -  15 Nov 2024 07:00  --------------------------------------------------------  IN: 1195 mL / OUT: 1029 mL / NET: 166 mL    15 Nov 2024 07:  -  15 Nov 2024 15:45  --------------------------------------------------------  IN: 133 mL / OUT: 945 mL / NET: -812 mL        Gen: Resting comfortably in bed, NAD  CV: S1S2, RRR  Lungs: CTA B/L  Abd: soft, appropriately tender, occasional BS x 4 quadrants.    Inc: midline vertical c/d/i with steri strips and obsite  Ext: SCD's in place and functional, non-tender b/l, no edema  UOP: 945 over 6 hrs      A/P: 39y Female s/p  hysterectomy @33+5 for placenta increta, EBL 1000. Patient is doing well post-operatively but being monitored in SICU for suspected euglycemic DKA.    Neuro: Tylenol, Toradol, Oxy PRN  CV: Hemodynamically stable.  Monitor VS. CBC in AM.   Pulm: Saturating well on room air.  GI: Diet per SICU  : Ken to gravity. UOP adequate  Heme: DVT ppx w/ SCD's while in bed. Recommend HSQ while in bed  ID: Afebrile  Endo: Euglycemic DKA, monitor FS, c/w insulin gtt  Dispo: SICU    FERNANDA Steinberg, PGY4

## 2024-11-15 NOTE — PROCEDURE NOTE - NSICDXPROCEDURE_GEN_ALL_CORE_FT
PROCEDURES:  Resuscitative endovascular balloon occlusion of aorta 15-Nov-2024 07:01:15 Sheet placed. Karly Anglin

## 2024-11-15 NOTE — PHYSICAL THERAPY INITIAL EVALUATION ADULT - GENERAL OBSERVATIONS, REHAB EVAL
Pt delivered 11/15 via cesarian section with total hysterectomy & bilateral salpingectomy on 11/15, transferred to SICU for post-operative care. Pt received seated in chair in NAD, VSS, +ICU monitoring, +gao, pt is A&Ox4, agreeable to PT. [employer]  used for session.

## 2024-11-15 NOTE — PROCEDURE NOTE - ADDITIONAL PROCEDURE DETAILS
Left groin 18Fr sheet placed. sheath removed at the conclusion of the case. I held pressure for 20 minutes.

## 2024-11-15 NOTE — CONSULT NOTE ADULT - CONSULT REASON
hemodynamic monitoring
placenta increta possible percreta into bladder
concern for euglycemic DKA
REBOA placement
joel and intra-operative assistance

## 2024-11-15 NOTE — CHART NOTE - NSCHARTNOTEFT_GEN_A_CORE
39 year old  at 33w+5d currently admitted to antepartum with known placenta increta c/f placenta percreta. Patient has received ACS x1. Called to bedside 2/2 increased vaginal bleeding. On exam  129cc on crystal with active bleeding noted from the cervical os. Given increase in vaginal bleeding, plan to proceed with delivery    -Main OR contacted  -GYN Onc, IR, Urology, Gen Surg aware    patient seen and evaluated with Dr Benitez, PGY3  evaluated w Dr Nimo Valdovinos PGY4

## 2024-11-15 NOTE — PRE PROCEDURE NOTE - PRE PROCEDURE EVALUATION
Interventional Radiology    HPI:  38y/o  @33.5wks w/ ant previa/increta now w/ active vaginal bleeding, for C-Hyst in hybrid OR    IR in house on standby for possible pelvic angiogram and uterine artery embolization if necessary    Allergies: No Known Allergies    Medications (Abx/Cardiac/Anticoagulation/Blood Products)  NIFEdipine IR: 10 milliGRAM(s) Oral ( @ 23:32)  NIFEdipine IR: 10 milliGRAM(s) Oral ( @ 17:30)    Home Medications  Dulcolax Stool Softener 100 mg oral capsule: 1 cap(s) orally 2 times a day  ferrous sulfate 325 mg (65 mg elemental iron) oral tablet: 1 tab(s) orally 2 times a day  Prenatal 1 oral capsule: 1 cap(s) orally once a day      -WBC 12.89 / HgB 10.7 / Hct 32.7 / Plt 329  -Na 136 / Cl 104 / BUN 8 / Glucose 105  -K 4.1 / CO2 15 / Cr 0.53  -ALT 23 / Alk Phos 214 / T.Bili 0.4  -INR0.89    -Risks/Benefits/alternatives explained with the patient and/or healthcare proxy and witnessed informed consent obtained.

## 2024-11-15 NOTE — PHYSICAL THERAPY INITIAL EVALUATION ADULT - PERTINENT HX OF CURRENT PROBLEM, REHAB EVAL
39F  @ 33w [facility] LT[facility] last pregnancy p/w concern for placenta percreta invading cervix and bladder; high risk for hemorrhage intraop. OBGYN planning for  hysterectomy 11/15 AM for which they are consulting ACS for preoperative REBOA placement given very high risk of bleeding. Pt denies other abd surgery, denies vascular arterial access in past, pt denies cardiac or thoracic surgery hx. No anticoagulation use in past. Pt delivered 11/15 via cesarian section with total hysterectomy and bilateral salpingectomy on 11/15 and transferred to SICU for post-operative care.

## 2024-11-15 NOTE — CONSULT NOTE ADULT - SUBJECTIVE AND OBJECTIVE BOX
39F  [facility] last [facility] p/w concern for placenta percreta invading cervix and bladder; high risk for hemorrhage intraop. OBGYN planning for  hysterectomy 11/15 AM for which they are consulting ACS for preoperative REBOA placement given very high risk of bleeding. Pt denies other abd surgery, denies vascular arterial access in past, pt denies cardiac or thoracic surgery hx. No anticoagulation use in past.     PAST MEDICAL & SURGICAL HISTORY:  No pertinent past medical history    No significant past surgical history    MEDICATIONS  (STANDING):  dextrose 5% + lactated ringers. 1000 milliLiter(s) (50 mL/Hr) IV Continuous <Continuous>  folic acid 1 milliGRAM(s) Oral daily  magnesium sulfate Infusion 2 Gm/Hr (50 mL/Hr) IV Continuous <Continuous>  NIFEdipine IR 10 milliGRAM(s) Oral every 6 hours  NIFEdipine IR 10 milliGRAM(s) Oral every 6 hours  prenatal multivitamin 1 Tablet(s) Oral daily    MEDICATIONS  (PRN):    Allergies    No Known Allergies    Intolerances  SOCIAL HISTORY:    FAMILY HISTORY:    Physical Exam:  General: NAD, resting comfortably, fetal monitoring in progress  HEENT: NC/AT, EOMI  Pulmonary: normal resp effort, CTA-B  Cardiovascular: VSS, NSR  Abdominal: soft, gravid uterus, LTCS scar well appearing. Palp femoral arterial pulses b/l  Extremities: WWP, DP/PT pulses palp. Some expected edema b/l  Neuro: A/O x 3, CNs II-XII grossly intact, normal sensation, no focal deficits    Vital Signs Last 24 Hrs  T(C): 36.7 (15 Nov 2024 01:44), Max: 36.9 (2024 10:05)  T(F): 98.06 (15 Nov 2024 01:44), Max: 98.4 (2024 10:05)  HR: 88 (15 Nov 2024 03:18) (76 - 127)  BP: 119/67 (15 Nov 2024 02:58) (100/52 - 152/77)  BP(mean): --  RR: 16 (15 Nov 2024 01:44) (14 - 18)  SpO2: 100% (15 Nov 2024 03:18) (92% - 100%)    Parameters below as of 2024 10:05  Patient On (Oxygen Delivery Method): room air    I&O's Summary    2024 07:01  -  15 Nov 2024 03:23  --------------------------------------------------------  IN: 1015 mL / OUT: 500 mL / NET: 515 mL    LABS:                        10.7   12.89 )-----------( 329      ( 2024 23:11 )             32.7     14    136  |  104  |  8   ----------------------------<  105[H]  4.1   |  15[L]  |  0.53    Ca    8.8      2024 23:11  Mg     4.8     11-15    TPro  6.6  /  Alb  3.1[L]  /  TBili  0.4  /  DBili  x   /  AST  25  /  ALT  23  /  AlkPhos  214[H]  11-14    PT/INR - ( 2024 23:11 )   PT: 10.2 sec;   INR: 0.89 ratio         PTT - ( 2024 23:11 )  PTT:27.3 sec  Urinalysis Basic - ( 2024 23:11 )    Color: x / Appearance: x / SG: x / pH: x  Gluc: 105 mg/dL / Ketone: x  / Bili: x / Urobili: x   Blood: x / Protein: x / Nitrite: x   Leuk Esterase: x / RBC: x / WBC x   Sq Epi: x / Non Sq Epi: x / Bacteria: x      CAPILLARY BLOOD GLUCOSE      POCT Blood Glucose.: 128 mg/dL (15 Nov 2024 02:37)  POCT Blood Glucose.: 109 mg/dL (2024 22:20)    LIVER FUNCTIONS - ( 2024 23:11 )  Alb: 3.1 g/dL / Pro: 6.6 g/dL / ALK PHOS: 214 U/L / ALT: 23 U/L / AST: 25 U/L / GGT: x

## 2024-11-15 NOTE — OB RN DELIVERY SUMMARY - NSSELHIDDEN_OBGYN_ALL_OB_FT
[NS_DeliveryAttending1_OBGYN_ALL_OB_FT:[id number]==],[NS_DeliveryAttending2_OBGYN_ALL_OB_FT:[id number]],[NS_DeliveryAssist1_OBGYN_ALL_OB_FT:[id number]]

## 2024-11-15 NOTE — CHART NOTE - NSCHARTNOTEFT_GEN_A_CORE
1900  - Divehi  #[id number]    Myself at the bedside to evaluate the patient. Patient says pain is controlled. Patient endorses fatigue. Denies any n/v. Tolerating PO. Has not passed flatus nor has she been out of bed     Vital Signs Last 24 Hrs  T(C): 36.6 (15 Nov 2024 15:00), Max: 37 (15 Nov 2024 11:00)  T(F): 97.8 (15 Nov 2024 15:00), Max: 98.6 (15 Nov 2024 11:00)  HR: 92 (15 Nov 2024 17:15) (80 - 116)  BP: 126/76 (15 Nov 2024 11:00) (100/52 - 152/77)  BP(mean): 97 (15 Nov 2024 11:00) (97 - 97)  RR: 16 (15 Nov 2024 17:15) (12 - 25)  SpO2: 96% (15 Nov 2024 17:15) (87% - 100%)    Parameters below as of 15 Nov 2024 15:00  Patient On (Oxygen Delivery Method): room air      Gen: No acute distress. Awake. Alert  CV: Regular rate and rhythm on bedside monitor. HR 90s   Pulm: Unlabored breathing. No respiratory distress   Abd: Soft. Appropriately tender. Midline vertical incision w/ overlying dressing and abdominal binder  : Gao catheter in place w/ brown tinged urine. x1 ucath remains in place    - No bleeding noted between legs   Extremities: No calf tenderness bilaterally. SCDs in place b/l     40yo POD#0 from  hysterectomy @33w5d for 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 Patient received 2U of pRBCs intra-op and is currently being monitored in the SICU for euglycemic DKA given noted beta-hydroxybutyrate. Pateint is progressing post-operatively     Neuro: per SICU  CV: Hemodynamically stable. 1644 CBC w/ H/H of 10.5/31.6  Pulm: Saturating well on room air  GI: Diet per SICU  : Gao to gravity. UOP adequate  - Consider removal of Ucath in AM vs. removal of gao entirety   Heme: DVT ppx w/ SCD's while in bed. Consider HSQ in bed   ID: Afebrile  Endo: Euglycemic DKA, monitor FS, c/w insulin gtt  -> Appreciate input by endocrinology  Dispo: Appreciate SICU care    Yuri Patel, PGY-3  Obstetrics & Gynecology     d/w Dr. RENETTA Rosenbaum, M

## 2024-11-15 NOTE — BRIEF OPERATIVE NOTE - OPERATION/FINDINGS
Ballooned out lower uterine segment with window however placenta contained within uterus. Cervix notably dilated to approximately 2 cm. Moderate adhesions between uterus and bladder. Normal bilateral fallopian tubes and ovaries. Bilateral ureteral catheters palpated and inferior and lateral to uterine arteries. Good hemostasis at end of case. Vistaseal and Nu-knit placed over surgical bed. Bladder backfilled with methylene blue without extravasation of dye seen.  Abd XRay performed for incorrect lap and needle counts and negative for retained foreign body. Hypervascular and ballooned out lower uterine segment with window however placenta appeared to be contained within uterus. Cervix notably dilated to approximately 2 cm. Moderate adhesions between uterus and bladder. Normal bilateral fallopian tubes and ovaries. Bilateral ureteral catheters palpated and inferior and lateral to uterine arteries. Good hemostasis at end of case. Vistaseal and Nu-knit placed over surgical bed. Bladder backfilled with methylene blue without extravasation of dye seen.  Abd XRay performed for incorrect lap and needle counts and negative for retained foreign body.

## 2024-11-15 NOTE — CONSULT NOTE ADULT - SUBJECTIVE AND OBJECTIVE BOX
=====================================================                                                             SICU Consultation Note                                                             =====================================================  Patient is a 39y old  Female who presents with a chief complaint of     HPI: 39y female  @ 33w PMH LTCS last pregnancy p/w concern for placenta accreta vs increta invading cervix and bladder; high risk for hemorrhage intraop. Patient underwent a c section, hysterectomy, BSO with obgyn, and reboa placement with trauma surgery, where the sheath was removed post op. SICU consulted given acidosis noted on post operatively blood gas.     Surgery Information   Case Duration:      EBL:  1L     Blood Products:  2u prbc    HISTORY  39y Female  Allergies: No Known Allergies    PAST MEDICAL & SURGICAL HISTORY:  No pertinent past medical history      No significant past surgical history        FAMILY HISTORY:    SOCIAL HISTORY:    ADVANCE DIRECTIVES: Presumed Full Code    REVIEW OF SYSTEMS:    General: Non-Contributory  Skin/Breast: Non-Contributory  Ophthalmologic: Non-Contributory  ENMT: Non-Contributory  Respiratory and Thorax: Non-Contributory  Cardiovascular: Non-Contributory  Gastrointestinal: Non-Contributory  Genitourinary: Non-Contributory  Musculoskeletal: Non-Contributory  Neurological: Non-Contributory  Psychiatric: Non-Contributory  Hematology/Lymphatics: Non-Contributory  Endocrine: Non-Contributory  Allergic/Immunologic: Non-Contributory  HOME MEDICATIONS:   CURRENT MEDICATIONS:   --------------------------------------------------------------------------------------  Neurologic Medications  acetaminophen   IVPB .. 1000 milliGRAM(s) IV Intermittent every 6 hours  HYDROmorphone  Injectable 0.25 milliGRAM(s) IV Push once    Respiratory Medications    Cardiovascular Medications    Gastrointestinal Medications    Genitourinary Medications    Hematologic/Oncologic Medications    Antimicrobial/Immunologic Medications    Endocrine/Metabolic Medications  insulin lispro (ADMELOG) corrective regimen sliding scale   SubCutaneous every 6 hours    Topical/Other Medications  chlorhexidine 2% Cloths 1 Application(s) Topical <User Schedule>    --------------------------------------------------------------------------------------  VITAL SIGNS, INS/OUTS (last 24 hours):  --------------------------------------------------------------------------------------  ICU Vital Signs Last 24 Hrs  T(C): 36.7 (15 Nov 2024 04:13), Max: 36.8 (2024 18:57)  T(F): 98.24 (15 Nov 2024 03:40), Max: 98.24 (2024 18:57)  HR: 89 (15 Nov 2024 12:15) (78 - 127)  BP: 126/76 (15 Nov 2024 11:00) (100/52 - 152/77)  BP(mean): 97 (15 Nov 2024 11:00) (97 - 97)  ABP: 124/70 (15 Nov 2024 12:15) (113/57 - 141/74)  ABP(mean): 89 (15 Nov 2024 12:15) (78 - 98)  RR: 15 (15 Nov 2024 12:15) (14 - 25)  SpO2: 97% (15 Nov 2024 12:15) (87% - 100%)  --------------------------------------------------------------------------------------    EXAM  NEUROLOGY  Exam: Normal, NAD  RESPIRATORY  Exam: nonlabored respirations  Mechanical Ventilation:   CARDIOVASCULAR  Exam:   HR: 89 (15 Nov 2024 12:15) (78 - 127)  BP: 126/76 (15 Nov 2024 11:00) (100/52 - 152/77)  BP(mean): 97 (15 Nov 2024 11:00) (97 - 97)  ABP: 124/70 (15 Nov 2024 12:15) (113/57 - 141/74)  ABP(mean): 89 (15 Nov 2024 12:15) (78 - 98)  GI/NUTRITION  Exam: Abdomen softly distended, appropriately tender  Current Diet:  NPO  VASCULAR  Exam: Extremities warm, pink, well-perfused.   MUSCULOSKELETAL  Exam: All extremities moving spontaneously without limitations.    SKIN:  Exam: Good skin turgor, no skin breakdown.    METABOLIC/FLUIDS/ELECTROLYTES    HEMATOLOGIC  [x] DVT Prophylaxis:   Transfusions:	[] PRBC	[] Platelets		[] FFP	[] Cryoprecipitate  INFECTIOUS DISEASE  Antimicrobials/Immunologic Medications:    Day #  	of    ***  Tubes/Lines/Drains  ***  [x] Peripheral IV  [] Central Venous Line     	[] R	[] L	[] IJ	[] Fem	[] SC	Date Placed:   [] Arterial Line		[] R	[] L	[] Fem	[] Rad	[] Ax	Date Placed:   [] PICC:         	[] Midline		[] Mediport  [] Urinary Catheter		Date Placed:     LABS  --------------------------------------------------------------------------------------  CBC (11-15 @ 10:50)                          10.9[L]                   18.81[H]  )--------------(  267        --    % Neuts, --    % Lymphs, ANC: --                              34.1[L]  CBC (11-15 @ 07:52)                          8.5[L]                   16.87[H]  )--------------(  285        87.2[H]% Neuts, 6.0[L]% Lymphs, ANC: 15.57[H]                          26.2[L]    BMP (11-15 @ 10:50)       137     |  106     |  8     			Ca++ --      Ca 8.2[L]       ---------------------------------( 179[H]		Mg 2.8[H]       4.4     |  10[LL]  |  0.48[L]			Ph 4.8[H]  BMP (11-15 @ 07:52)       137     |  105     |  9     			Ca++ --      Ca 7.7[L]       ---------------------------------( 152[H]		Mg --           4.4     |  14[L]   |  0.51  			Ph --        LFTs (11-15 @ 10:50)      TPro 5.6[L] / Alb 2.6[L] / TBili 0.8 / DBili -- / AST 22 / ALT 19 / AlkPhos 177[H]  LFTs (11-15 @ 07:52)      TPro 5.4[L] / Alb 2.5[L] / TBili 0.3 / DBili -- / AST 18 / ALT 17 / AlkPhos 169[H]    Coags (11-15 @ 10:50)  aPTT 25.8 / INR 0.91 / PT 10.5  Coags (11-15 @ 07:52)  aPTT 24.5 / INR 0.89 / PT 10.2      ABG (11-15 @ 10:25)     7.23[L] / 28[L] / 88 / 12[L] / -14.4[L] / 96.4%     Lactate:    ABG (11-15 @ 08:28)     7.24[L] / 30[L] / 271[H] / 13[L] / -13.3[L] / 99.8[H]%     Lactate:        Urinalysis (11-15 @ 12:33):     Color: Yellow / Appearance: Clear / S.016 / pH: 5.5 / Gluc: Negative / Ketones: >=160[!] / Bili: Negative / Urobili: 0.2 / Protein :30[!] / Nitrites: Negative / Leuk.Est: Negative / RBC: 257[H] / WBC: 4 / Sq Epi:  / Non Sq Epi: 1 / Bacteria Negative     Urinalysis (11-15 @ 10:50):     Color:  / Appearance:  / SG:  / pH:  / Gluc: 179[H] / Ketones:  / Bili:  / Urobili:  / Protein : / Nitrites:  / Leuk.Est:  / RBC:  / WBC:  / Sq Epi:  / Non Sq Epi:  / Bacteria          --------------------------------------------------------------------------------------  OTHER LABS  IMAGING RESULTS  Echo:   CT:

## 2024-11-15 NOTE — PHYSICAL THERAPY INITIAL EVALUATION ADULT - DID THE PATIENT HAVE SURGERY?
Operative laparotomy, REBOA sheeth placement, cesarian section, total hysterectomy, asya salpingectomy, cystoscopy with uretheral catheter placement/yes

## 2024-11-15 NOTE — CONSULT NOTE ADULT - SUBJECTIVE AND OBJECTIVE BOX
HPI:  [place]oc[place]  # [id number]    HPI: Pt is a 38yo  @ 33w4d who presents for care coordination in the setting of recent ultrasound imaging findings concerning for accreta vs. increta on 2024.  Fetal movement (+)  Leakage of fluid (-)  Contractions, denied feeling at time of inital eval (some ctx noted on the monitor however)  Vaginal bleeding (-)  GBS unk  Estimated fetal weight: 2818g,     Prenatal care complicated by: Above  - Patient has a complete previa     - Low risk NIPs  - Nl hr GCT  - Elevated JOCELYN (24.5) w/ EFW 2818g (98%) and AC >99%     OBHx:  - . . 3400g. Uncomplicated. In [facility]  - . . 3600g. Uncomplicated. In [facility]  - . D&C for miscarriage  - 2016. FT. pLTCS for NRFHT. 4262g  GynHx: Denies  PMHx: Denies  PSHx: Above   Med: PNV  All: NKDA  SH: Denies hx of smoking, drinking, or drug usage during the pregnanc    FHT: 125/mod/(+)accels/(-)decels   [place]: q2-4min  Sono: Vertex     (2024 10:22)    [place]  Paz ID [id number] utilized for this encounter    DIABETES HX:  Patient is a 39 year old female with past medical history of hypertension who presented to the hospital for  after placenta accreta noted on outpatient imaging.  After CS, BHB measured to be 4.2, bicarb 10, pH 7.23   It is unclear if patient had a history of GDM. She denies a history of T2DM.   Her FSBG was initially 79 upon presentation yesterday and has been rising since despite being NPO.   She did receive two doses of betamethasone for fetal lung development acceleration.     PAST MEDICAL & SURGICAL HISTORY:  No pertinent past medical history      No significant past surgical history        Social History: no tobacco or alcohol use    Inpatient medications:  MEDICATIONS  (STANDING):  acetaminophen   IVPB .. 1000 milliGRAM(s) IV Intermittent every 6 hours  chlorhexidine 2% Cloths 1 Application(s) Topical <User Schedule>  dextrose 5% + sodium chloride 0.45%. 1000 milliLiter(s) (125 mL/Hr) IV Continuous <Continuous>  HYDROmorphone  Injectable 0.25 milliGRAM(s) IV Push once  insulin lispro (ADMELOG) corrective regimen sliding scale   SubCutaneous every 6 hours  insulin regular Infusion 8 Unit(s)/Hr (8 mL/Hr) IV Continuous <Continuous>    MEDICATIONS  (PRN):      Allergies    No Known Allergies    Intolerances      Review of Systems:  Constitutional: No fever  Eyes: No blurry vision  Neuro: No tremors  HEENT: No pain  Cardiovascular: No chest pain, palpitations  Respiratory: No SOB, no cough  GI: No nausea, vomiting, abdominal pain  : No dysuria  Skin: no rash  Psych: no depression  Endocrine: no polyuria, polydipsia  Hem/lymph: no swelling  Osteoporosis: no fractures    ALL OTHER SYSTEMS REVIEWED AND NEGATIVE    PHYSICAL EXAM:  VITALS: T(C): 37 (11-15-24 @ 11:00)  T(F): 98.6 (11-15-24 @ 11:00), Max: 98.6 (11-15-24 @ 11:00)  HR: 84 (11-15-24 @ 14:00) (78 - 127)  BP: 126/76 (11-15-24 @ 11:00) (100/52 - 152/77)  RR:  (13 - 25)  SpO2:  (87% - 100%)  Wt(kg): 93 kg prebirth  GENERAL: NAD, well-groomed, well-developed  EYES: No proptosis, no lid lag, anicteric  HEENT:  Atraumatic, Normocephalic, moist mucous membranes  RESPIRATORY: Normal respiratory effort; no audible wheezing  SKIN: Dry, intact, No rashes or lesions  MUSCULOSKELETAL: Full range of motion, normal strength  NEURO: sensation intact, extraocular movements intact, no tremor  PSYCH: Alert and oriented x 3, normal affect, normal mood  CUSHING'S SIGNS: no striae                          10.9   18.81 )-----------( 267      ( 15 Nov 2024 10:50 )             34.1       11-15    137  |  106  |  8   ----------------------------<  179[H]  4.4   |  10[LL]  |  0.48[L]    eGFR: 123    Ca    8.2[L]      11-15  Mg     2.8     -15  Phos  4.8     -15    TPro  5.6[L]  /  Alb  2.6[L]  /  TBili  0.8  /  DBili  x   /  AST  22  /  ALT  19  /  AlkPhos  177[H]  -15          CAPILLARY BLOOD GLUCOSE      POCT Blood Glucose.: 186 mg/dL (15 Nov 2024 13:55)  POCT Blood Glucose.: 185 mg/dL (15 Nov 2024 12:03)  POCT Blood Glucose.: 125 mg/dL (15 Nov 2024 04:03)  POCT Blood Glucose.: 128 mg/dL (15 Nov 2024 02:37)  POCT Blood Glucose.: 109 mg/dL (2024 22:20)

## 2024-11-15 NOTE — OB NEONATOLOGY/PEDIATRICIAN DELIVERY SUMMARY - NSPEDSNEONOTESA_OBGYN_ALL_OB_FT
Pediatrician called to delivery for suspected maternal placenta accreta, prematurity. Male infant born at 33.4wks via  to a 39 y/o  blood type O+ mother. Maternal history of GDMA1, placenta increta. Prenatal history of polyhydramnios, LGA. Prenatal labs nr/immune/-, GBS + on . ROM at at delivery clear fluids. Baby Csection after 1 hour of mother being under general anesthesia. Cord clamping immediately. Infant was brought to radiant warmer and warmed, dried, stimulated and suctioned. HR<100, no respiratory effort. PPV started immediately. Poor chest rise, increased PPV to 30/5, improved color and respiratory effort. Max FiO2 100%. Increased CPAP PEEP to 6 at about 7 minutes of life. Improved saturations and spontaneous respiratory effort. APGARS of 2/5/8. Infant brought from main OR on bCPAP 6 100%. Mom is initiating breast feeding.  Desires for infant to be circumcised.     BW: 2820g  : 11/15/2024    Dr. Palacios, Neonatologist was present at the delivery Pediatrician called to delivery for maternal placenta accreta/increta, prematurity, and vaginal bleeding. Male infant born at 33.5 wks via  to a 41 y/o  blood type O+ mother. Maternal history of GDMA1, placenta increta. Prenatal history of polyhydramnios, LGA. Prenatal labs nr/immune/-, GBS + on . Mother was on magnesium prior to delivery due to occasional contractions, no  labor.  ROM at at delivery clear fluids. Baby  after ~ 1 hour of mother being under general anesthesia. Cord clamping immediately. Infant was brought to radiant warmer and warmed, dried, stimulated and suctioned. HR<100, no respiratory effort. PPV started immediately. Poor chest rise, increased PPV to 30/5, improved color and respiratory effort. Max FiO2 100%. Transitioned to CPAP at about 5 min of life, but required PPV on and off for intermittent apnea until about 8 min of life when CPAP PEEP was increased to 6. Improved saturations and spontaneous respiratory effort. APGARS of 2/5/8. Infant brought from main OR on bCPAP 6 100%. Mom would like to initiate breast feeding.  Desires for infant to be circumcised.     BW: 2820g  : 11/15/2024    Dr. Palacios, Neonatologist was present at the delivery

## 2024-11-15 NOTE — CHART NOTE - NSCHARTNOTEFT_GEN_A_CORE
/Attending:    Called to the room as pt started to have vaginal bleeding.  Pt examined and had QBL of 129ml and active bleeding from cervical os was noted.  VSS  FHR: 125bpm, reactive  H/H: 10.7/32.7    MFM called and recommend delivery at this time.    A/P  -38y/o  @33.5wks w/ ant previa/increta now w/ active vaginal bleeding, for C-Hyst.  -OR called and being prepped.  -Mg to be discontinued as per MFM and MFM en-route.  -Anesthesia aware  -NICU aware  -Gyn-Onc and Surgery called and en-route.  -IR will be on standby  -Blood on hold.  -Pt to OR; as per MDM to possibly recover in the SICU.    Dr. Horner

## 2024-11-15 NOTE — OB PROVIDER DELIVERY SUMMARY - NSEPISIOTOMY_OBGYN_ALL_OB
She usually is on 5 mg/d. Have her take 2.5 mg which is half of 5 mg which she should have FRI/SAT/SUN and have INR checked MON.     She has been off for 3 days so more days is not good idea.    No

## 2024-11-15 NOTE — CONSULT NOTE ADULT - CRITICAL CARE ATTENDING COMMENT
Placenta Accreta - hemorrhage followed by hysterectomy  got two units intra-operative  post operatively noted to have acidoses   a REBOA catheter was placed but since removed  workup suggested euglycemia DKA   started D5 and insulin drip

## 2024-11-15 NOTE — PHYSICAL THERAPY INITIAL EVALUATION ADULT - ASSISTIVE DEVICE FOR TRANSFER: GAIT, REHAB EVAL
03/09/20 0700   Team Meeting   Meeting Type Daily Rounds   Team Members Present   Team Members Present Physician;Nurse;;; Other (Discipline and Name)   Physician Team Member [address] Team Member [facility] Management Team Member Shimon Biswas   Other (Discipline and Name) Devora NP, students   Patient/Family Present   Patient Present No   Patient's Family Present No     Pt still manic, increase depakote, remain on 1-1, pt still saying inappropriate things hand held assist

## 2024-11-15 NOTE — CONSULT NOTE ADULT - ASSESSMENT
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 Patient underwent a , hysterectomy, BSO with obgyn, and reboa placement with trauma surgery, where the sheath was removed post op. SICU consulted given acidosis noted on post operatively blood gas.     PLAN:  NEUROLOGIC   - Pain control: IV tylenol, dilaudid PRN     RESPIRATORY   - Monitor SpO2 goal >92%  - Incentive spirometry     CARDIOVASCULAR   - Monitor hemodynamics     GASTROINTESTINAL   - Diet: NPO    /RENAL   - IV fluids: D5 1/2 NS @ 125cc/hr per DKA protocol  - Maintain gao catheter, strict Is/Os  - Monitor electrolytes, replete PRN    HEMATOLOGIC  - Monitor H/H   - holding chemical dvt ppx    INFECTIOUS DISEASE  - Monitor fever / WBC    ENDOCRINE  - Monitor gluc   - concern for euglycemic DKA. start DKA protocol  - f/u endocrine recs    LINES  - PIV     DISPO: SICU    Plan discussed with attending Dr. Joaquin

## 2024-11-15 NOTE — OB RN DELIVERY SUMMARY - NS_SEPSISRSKCALC_OBGYN_ALL_OB_FT
Unable to calculate the EOS score due to gestational age being less than 34 weeks or more than 43 weeks.  'Type of intrapartum antibiotics' must be entered above.   Unable to calculate the EOS score due to gestational age being less than 34 weeks or more than 43 weeks.

## 2024-11-15 NOTE — CHART NOTE - NSCHARTNOTEFT_GEN_A_CORE
Patient evaluated at bedside to reassess bleeding. No vaginal bleeding on pad.    Vital Signs Last 24 Hrs  T(C): 36.8 (2024 18:57), Max: 36.9 (2024 10:05)  T(F): 98.24 (2024 18:57), Max: 98.4 (2024 10:05)  HR: 90 (15 Nov 2024 02:14) (76 - 127)  BP: 117/72 (15 Nov 2024 02:14) (100/52 - 152/77)  BP(mean): --  RR: 14 (2024 18:57) (14 - 18)  SpO2: 96% (15 Nov 2024 02:13) (92% - 100%)    Parameters below as of 2024 10:05  Patient On (Oxygen Delivery Method): room air                 10.7   12.89 )-----------( 329      ( 11-14 @ 23:11 )             32.7     11-14 @ 23:11    136  |  104  |  8   ----------------------------<  105  4.1   |  15  |  0.53    PT/INR - ( 2024 23:11 )   PT: 10.2 sec;   INR: 0.89 ratio         PTT - ( 2024 23:11 )  PTT:27.3 sec        38yo  at 33w3d with suspected increta reevaluated 2h s/p prior evaluation. Patient with no blood on pad at this time. H/H stable. Patient is clinically stable.  - All consult services informed that  hysterectomy will take place in AM  - Early BMZ at 230am  - AM CBC/coags/CMP  - c/w Nifedipine tocolysis  - Mg per MFM  - OR aware of patient  - Urology aware, plan for ureteral catheter placement   - Anesthesia aware  - Gyn/Onc aware  - IR aware, plan to be on standby  - Trauma surgery aware, plan for Reboa  - Plan for  hysterectomy in AM    d/w Dr. Nimo Benitez, PGY3

## 2024-11-15 NOTE — BRIEF OPERATIVE NOTE - NSICDXBRIEFPROCEDURE_GEN_ALL_CORE_FT
PROCEDURES:  Total hysterectomy after  section 15-Nov-2024 11:20:29  Bailey Steinberg  Salpingectomy, bilateral, open 15-Nov-2024 11:21:21  Bailey Steinberg

## 2024-11-15 NOTE — OB PROVIDER DELIVERY SUMMARY - NSPROVIDERDELIVERYNOTE_OBGYN_ALL_OB_FT
Repeat  hysterectomy and bilateral salpingectomy for placenta increta. Midline skin incision made extending to 2cm above umbilicus. High vertical uterine incision made.  Viable male infant, vertex presentation but delivered in standard breech fashion. Apgars 2/5/8, cord gasses sent. Hysterotomy closed with 0 vicryl in a running fashion with good hemostasis noted. Grossly normal fallopian tubes and ovaries. Uterine window with bulging placenta noted. At this time GYN Oncology scrubbed in to perform hysterectomy. Dr. Real and Dr. De La Torre scrubbed in for closure.  Fascia closed with loop PDS  Subcutaneous closed with 3-0 vicryl in 3 layers  Skin closed with 3-0 barbed monocryl    EBL: 1000  IVF: 3500+2u PRBC  UOP: 600    M. Maryan, PGY4  w/ Dr. Suero Repeat  hysterectomy and bilateral salpingectomy for placenta increta. Midline skin incision made extending to 2cm above umbilicus. High vertical uterine incision made.  Viable male infant, vertex presentation but delivered in standard breech fashion. Apgars 2/5/8, cord gasses sent. Hysterotomy closed with 0 vicryl in a running fashion with good hemostasis noted. Grossly normal fallopian tubes and ovaries. Uterine window with bulging placenta noted. At this time GYN Oncology scrubbed in to perform hysterectomy. Dr. Real and Dr. De La Torre scrubbed in for closure.  Fascia closed with loop PDS  Subcutaneous closed with 3-0 vicryl in 3 layers  Skin closed with 3-0 barbed monocryl    EBL: 1000  IVF: 3500+2u PRBC  UOP: 600  Dictation# [id number]    FERNANDA Steinberg, PGY4  w/ Dr. Suero Repeat  hysterectomy and bilateral salpingectomy for placenta increta. Midline skin incision made extending to 2cm above umbilicus. High vertical uterine incision made.  Viable male infant, vertex presentation but delivered in standard breech fashion. Apgars 2/5/8, cord gasses sent. Placenta left inside uterus.  Hysterotomy closed with 0 vicryl in a running fashion with good hemostasis noted. Grossly normal fallopian tubes and ovaries. Uterine window with bulging placenta noted. At this time GYN Oncology scrubbed in to perform hysterectomy. Dr. Real and Dr. De La Torre scrubbed in for closure.  Fascia closed with loop PDS  Subcutaneous closed with 3-0 vicryl in 3 layers  Skin closed with 3-0 barbed monocryl    EBL: 1000  IVF: 3500+2u PRBC  UOP: 600  Dictation# [id number]    FERNANDA Steinberg, PGY4  w/ Dr. Suero    /Attending:    I was present for the entire delivery and surgical case and I agree with the above delivery note.  Surgery was a multidisciplinary case, so please see operative note.     Dr. Horner

## 2024-11-15 NOTE — CONSULT NOTE ADULT - ASSESSMENT
The patient is a 39y Female with PMH of HTN who presented to the hospital for .   Endocrinology consulted for concern for euglycemic DKA    #Steroid-induced hyperglycemia  #Euglycemic DKA  - Follows with: PCP  - eGFR: 123 mL/min/1.73m2 (11-15-24)  - Weight (kg): 93 (11-15-24)  - glucose above goal, BHB 4.2, bicarb 10, pH 7.23  - betamethasone X2  Technically, a diagnosis of euglycemic DKA requires a diagnosis of diabetes. This is unclear in this patient. Her fingersticks are higher than expected but she denies history of GDM or T2DM. Unclear what level of prenatal care she received.    INPATIENT PLAN:  - Recommend initiation of insulin infusion for treatment of euglycemic DKA with D5 titrate, titrate per protocol  - FS q1h  - Repeat BHB, VBG, BMP q4h  - Can stop drip and start PO diet when BHB, VBG and BMP improve on two sets of consecutive labs  - After that, continue with low-dose admelog correction with meals and separate correction at bedtime only  - A1C will not be reliable in post-partum state, especially as patient received 2 units PRBC in the OR.     DISCHARGE PLANNING:  - Discharge recs pending clinical course  - will need Endocrinology follow up. Please provide clinic info in DC paperwork for patient to make an appointment:    #Hypertension  - Goal BP <130/80  - Management as per primary team  - check urine albumin level as outpatient    Pending discussion with attending physician.  INCOMPLETE NOTE          The patient is a 39y Female with PMH of HTN who presented to the hospital for .   Endocrinology consulted for concern for euglycemic DKA    #Steroid-induced hyperglycemia  #Euglycemic DKA  - Follows with: PCP  - eGFR: 123 mL/min/1.73m2 (11-15-24)  - Weight (kg): 93 (11-15-24)  - glucose above goal, BHB 4.2, bicarb 10, pH 7.23  - betamethasone X2  Technically, a diagnosis of euglycemic DKA requires a diagnosis of diabetes. This is unclear in this patient. Her fingersticks are higher than expected but she denies history of GDM or T2DM. Unclear what level of prenatal care she received.    INPATIENT PLAN:  - Recommend initiation of insulin infusion for treatment of euglycemic DKA with D5 titrate, titrate per protocol  - FS q1h  - Repeat BHB, VBG, BMP q4h, due for recheck now  - Can stop drip when BHB, VBG and BMP improve on two sets of consecutive labs  - OK to start PO diet at this time with carb-consistent diet  - After that, continue with low-dose admelog correction with meals and separate correction at bedtime only  - A1C will not be reliable in post-partum state, especially as patient received 2 units PRBC in the OR.     DISCHARGE PLANNING:  - Discharge recs pending clinical course  - will need Endocrinology follow up. Please provide clinic info in DC paperwork for patient to make an appointment:    #Hypertension  - Goal BP <130/80  - Management as per primary team  - check urine albumin level as outpatient    Discussed with attending physician.  Gabe Gupta DO   PGY-4 Endocrine Fellow  Can be reached via [employer] Teams.    For follow up questions, discharge recommendations or new consults, please email [email or web address] ([email or web address]) or [email or web address] ([email or web address]) or call the answering service at [phone] (weekdays); [phone] (nights/weekends).   For emergencies, please page fellow on call.

## 2024-11-16 LAB
ALBUMIN SERPL ELPH-MCNC: 2.3 G/DL — LOW (ref 3.3–5)
ALBUMIN SERPL ELPH-MCNC: 2.7 G/DL — LOW (ref 3.3–5)
ALP SERPL-CCNC: 146 U/L — HIGH (ref 40–120)
ALP SERPL-CCNC: 150 U/L — HIGH (ref 40–120)
ALT FLD-CCNC: 13 U/L — SIGNIFICANT CHANGE UP (ref 10–45)
ALT FLD-CCNC: 16 U/L — SIGNIFICANT CHANGE UP (ref 10–45)
ANION GAP SERPL CALC-SCNC: 10 MMOL/L — SIGNIFICANT CHANGE UP (ref 5–17)
ANION GAP SERPL CALC-SCNC: 11 MMOL/L — SIGNIFICANT CHANGE UP (ref 5–17)
APTT BLD: 23.2 SEC — LOW (ref 24.5–35.6)
AST SERPL-CCNC: 17 U/L — SIGNIFICANT CHANGE UP (ref 10–40)
AST SERPL-CCNC: 18 U/L — SIGNIFICANT CHANGE UP (ref 10–40)
B-OH-BUTYR SERPL-SCNC: 0.1 MMOL/L — SIGNIFICANT CHANGE UP
BILIRUB SERPL-MCNC: 0.2 MG/DL — SIGNIFICANT CHANGE UP (ref 0.2–1.2)
BILIRUB SERPL-MCNC: 0.2 MG/DL — SIGNIFICANT CHANGE UP (ref 0.2–1.2)
BUN SERPL-MCNC: 12 MG/DL — SIGNIFICANT CHANGE UP (ref 7–23)
BUN SERPL-MCNC: 13 MG/DL — SIGNIFICANT CHANGE UP (ref 7–23)
CALCIUM SERPL-MCNC: 7.8 MG/DL — LOW (ref 8.4–10.5)
CALCIUM SERPL-MCNC: 8.4 MG/DL — SIGNIFICANT CHANGE UP (ref 8.4–10.5)
CHLORIDE SERPL-SCNC: 103 MMOL/L — SIGNIFICANT CHANGE UP (ref 96–108)
CHLORIDE SERPL-SCNC: 106 MMOL/L — SIGNIFICANT CHANGE UP (ref 96–108)
CO2 SERPL-SCNC: 18 MMOL/L — LOW (ref 22–31)
CO2 SERPL-SCNC: 19 MMOL/L — LOW (ref 22–31)
CREAT SERPL-MCNC: 0.64 MG/DL — SIGNIFICANT CHANGE UP (ref 0.5–1.3)
CREAT SERPL-MCNC: 0.82 MG/DL — SIGNIFICANT CHANGE UP (ref 0.5–1.3)
EGFR: 114 ML/MIN/1.73M2 — SIGNIFICANT CHANGE UP
EGFR: 93 ML/MIN/1.73M2 — SIGNIFICANT CHANGE UP
GAS PNL BLDV: SIGNIFICANT CHANGE UP
GLUCOSE BLDC GLUCOMTR-MCNC: 106 MG/DL — HIGH (ref 70–99)
GLUCOSE BLDC GLUCOMTR-MCNC: 133 MG/DL — HIGH (ref 70–99)
GLUCOSE BLDC GLUCOMTR-MCNC: 136 MG/DL — HIGH (ref 70–99)
GLUCOSE BLDC GLUCOMTR-MCNC: 138 MG/DL — HIGH (ref 70–99)
GLUCOSE BLDC GLUCOMTR-MCNC: 139 MG/DL — HIGH (ref 70–99)
GLUCOSE BLDC GLUCOMTR-MCNC: 143 MG/DL — HIGH (ref 70–99)
GLUCOSE SERPL-MCNC: 137 MG/DL — HIGH (ref 70–99)
GLUCOSE SERPL-MCNC: 202 MG/DL — HIGH (ref 70–99)
HCT VFR BLD CALC: 32 % — LOW (ref 34.5–45)
HGB BLD-MCNC: 10.6 G/DL — LOW (ref 11.5–15.5)
INR BLD: 0.92 RATIO — SIGNIFICANT CHANGE UP (ref 0.85–1.16)
MAGNESIUM SERPL-MCNC: 1.9 MG/DL — SIGNIFICANT CHANGE UP (ref 1.6–2.6)
MAGNESIUM SERPL-MCNC: 2.1 MG/DL — SIGNIFICANT CHANGE UP (ref 1.6–2.6)
MCHC RBC-ENTMCNC: 29.7 PG — SIGNIFICANT CHANGE UP (ref 27–34)
MCHC RBC-ENTMCNC: 33.1 G/DL — SIGNIFICANT CHANGE UP (ref 32–36)
MCV RBC AUTO: 89.6 FL — SIGNIFICANT CHANGE UP (ref 80–100)
NRBC # BLD: 0 /100 WBCS — SIGNIFICANT CHANGE UP (ref 0–0)
PHOSPHATE SERPL-MCNC: 2.8 MG/DL — SIGNIFICANT CHANGE UP (ref 2.5–4.5)
PHOSPHATE SERPL-MCNC: 3.1 MG/DL — SIGNIFICANT CHANGE UP (ref 2.5–4.5)
PLATELET # BLD AUTO: 313 K/UL — SIGNIFICANT CHANGE UP (ref 150–400)
POTASSIUM SERPL-MCNC: 4 MMOL/L — SIGNIFICANT CHANGE UP (ref 3.5–5.3)
POTASSIUM SERPL-MCNC: 4.2 MMOL/L — SIGNIFICANT CHANGE UP (ref 3.5–5.3)
POTASSIUM SERPL-SCNC: 4 MMOL/L — SIGNIFICANT CHANGE UP (ref 3.5–5.3)
POTASSIUM SERPL-SCNC: 4.2 MMOL/L — SIGNIFICANT CHANGE UP (ref 3.5–5.3)
PROT SERPL-MCNC: 4.6 G/DL — LOW (ref 6–8.3)
PROT SERPL-MCNC: 5.7 G/DL — LOW (ref 6–8.3)
PROTHROM AB SERPL-ACNC: 10.6 SEC — SIGNIFICANT CHANGE UP (ref 9.9–13.4)
RBC # BLD: 3.57 M/UL — LOW (ref 3.8–5.2)
RBC # FLD: 14.6 % — HIGH (ref 10.3–14.5)
SODIUM SERPL-SCNC: 133 MMOL/L — LOW (ref 135–145)
SODIUM SERPL-SCNC: 134 MMOL/L — LOW (ref 135–145)
WBC # BLD: 19.87 K/UL — HIGH (ref 3.8–10.5)
WBC # FLD AUTO: 19.87 K/UL — HIGH (ref 3.8–10.5)

## 2024-11-16 PROCEDURE — 99232 SBSQ HOSP IP/OBS MODERATE 35: CPT

## 2024-11-16 RX ORDER — HYDROMORPHONE HYDROCHLORIDE 2 MG/1
0.5 TABLET ORAL
Refills: 0 | Status: DISCONTINUED | OUTPATIENT
Start: 2024-11-16 | End: 2024-11-17

## 2024-11-16 RX ORDER — SIMETHICONE 125 MG
80 CAPSULE ORAL EVERY 6 HOURS
Refills: 0 | Status: DISCONTINUED | OUTPATIENT
Start: 2024-11-16 | End: 2024-11-19

## 2024-11-16 RX ORDER — OXYCODONE HYDROCHLORIDE 30 MG/1
10 TABLET ORAL ONCE
Refills: 0 | Status: DISCONTINUED | OUTPATIENT
Start: 2024-11-16 | End: 2024-11-16

## 2024-11-16 RX ORDER — 0.9 % SODIUM CHLORIDE 0.9 %
1000 INTRAVENOUS SOLUTION INTRAVENOUS
Refills: 0 | Status: DISCONTINUED | OUTPATIENT
Start: 2024-11-16 | End: 2024-11-16

## 2024-11-16 RX ORDER — SENNOSIDES 8.6 MG
2 TABLET ORAL AT BEDTIME
Refills: 0 | Status: DISCONTINUED | OUTPATIENT
Start: 2024-11-16 | End: 2024-11-19

## 2024-11-16 RX ORDER — OXYCODONE HYDROCHLORIDE 30 MG/1
10 TABLET ORAL EVERY 4 HOURS
Refills: 0 | Status: DISCONTINUED | OUTPATIENT
Start: 2024-11-16 | End: 2024-11-19

## 2024-11-16 RX ORDER — POLYETHYLENE GLYCOL 3350 17 G/17G
17 POWDER, FOR SOLUTION ORAL DAILY
Refills: 0 | Status: DISCONTINUED | OUTPATIENT
Start: 2024-11-16 | End: 2024-11-19

## 2024-11-16 RX ORDER — ONDANSETRON HYDROCHLORIDE 4 MG/1
4 TABLET, FILM COATED ORAL ONCE
Refills: 0 | Status: COMPLETED | OUTPATIENT
Start: 2024-11-16 | End: 2024-11-16

## 2024-11-16 RX ORDER — HEPARIN SODIUM 5000 [USP'U]/.5ML
15 INJECTION, SOLUTION INTRAVENOUS; SUBCUTANEOUS ONCE
Refills: 0 | Status: COMPLETED | OUTPATIENT
Start: 2024-11-16 | End: 2024-11-16

## 2024-11-16 RX ORDER — OXYCODONE HYDROCHLORIDE 30 MG/1
5 TABLET ORAL EVERY 4 HOURS
Refills: 0 | Status: DISCONTINUED | OUTPATIENT
Start: 2024-11-16 | End: 2024-11-19

## 2024-11-16 RX ORDER — ACETAMINOPHEN 500MG 500 MG/1
1000 TABLET, COATED ORAL EVERY 6 HOURS
Refills: 0 | Status: COMPLETED | OUTPATIENT
Start: 2024-11-16 | End: 2024-11-17

## 2024-11-16 RX ADMIN — CHLORHEXIDINE GLUCONATE 1 APPLICATION(S): 1.2 RINSE ORAL at 05:13

## 2024-11-16 RX ADMIN — POLYETHYLENE GLYCOL 3350 17 GRAM(S): 17 POWDER, FOR SOLUTION ORAL at 13:16

## 2024-11-16 RX ADMIN — Medication 80 MILLIGRAM(S): at 21:26

## 2024-11-16 RX ADMIN — ACETAMINOPHEN 500MG 400 MILLIGRAM(S): 500 TABLET, COATED ORAL at 05:00

## 2024-11-16 RX ADMIN — OXYCODONE HYDROCHLORIDE 10 MILLIGRAM(S): 30 TABLET ORAL at 17:04

## 2024-11-16 RX ADMIN — ONDANSETRON HYDROCHLORIDE 4 MILLIGRAM(S): 4 TABLET, FILM COATED ORAL at 12:16

## 2024-11-16 RX ADMIN — OXYCODONE HYDROCHLORIDE 10 MILLIGRAM(S): 30 TABLET ORAL at 23:26

## 2024-11-16 RX ADMIN — ACETAMINOPHEN 500MG 400 MILLIGRAM(S): 500 TABLET, COATED ORAL at 17:20

## 2024-11-16 RX ADMIN — HYDROMORPHONE HYDROCHLORIDE 0.5 MILLIGRAM(S): 2 TABLET ORAL at 11:03

## 2024-11-16 RX ADMIN — HEPARIN SODIUM 63.75 MILLIMOLE(S): 5000 INJECTION, SOLUTION INTRAVENOUS; SUBCUTANEOUS at 14:36

## 2024-11-16 RX ADMIN — OXYCODONE HYDROCHLORIDE 10 MILLIGRAM(S): 30 TABLET ORAL at 21:25

## 2024-11-16 RX ADMIN — ACETAMINOPHEN 500MG 1000 MILLIGRAM(S): 500 TABLET, COATED ORAL at 12:46

## 2024-11-16 RX ADMIN — HYDROMORPHONE HYDROCHLORIDE 0.5 MILLIGRAM(S): 2 TABLET ORAL at 05:00

## 2024-11-16 RX ADMIN — Medication 2 TABLET(S): at 21:25

## 2024-11-16 RX ADMIN — OXYCODONE HYDROCHLORIDE 5 MILLIGRAM(S): 30 TABLET ORAL at 19:45

## 2024-11-16 RX ADMIN — HYDROMORPHONE HYDROCHLORIDE 0.5 MILLIGRAM(S): 2 TABLET ORAL at 05:30

## 2024-11-16 RX ADMIN — ENOXAPARIN SODIUM 40 MILLIGRAM(S): 30 INJECTION SUBCUTANEOUS at 08:05

## 2024-11-16 RX ADMIN — ACETAMINOPHEN 500MG 1000 MILLIGRAM(S): 500 TABLET, COATED ORAL at 05:30

## 2024-11-16 RX ADMIN — OXYCODONE HYDROCHLORIDE 10 MILLIGRAM(S): 30 TABLET ORAL at 16:34

## 2024-11-16 RX ADMIN — OXYCODONE HYDROCHLORIDE 10 MILLIGRAM(S): 30 TABLET ORAL at 08:04

## 2024-11-16 RX ADMIN — OXYCODONE HYDROCHLORIDE 10 MILLIGRAM(S): 30 TABLET ORAL at 08:34

## 2024-11-16 RX ADMIN — ACETAMINOPHEN 500MG 400 MILLIGRAM(S): 500 TABLET, COATED ORAL at 12:16

## 2024-11-16 RX ADMIN — OXYCODONE HYDROCHLORIDE 5 MILLIGRAM(S): 30 TABLET ORAL at 20:15

## 2024-11-16 RX ADMIN — HYDROMORPHONE HYDROCHLORIDE 0.5 MILLIGRAM(S): 2 TABLET ORAL at 11:33

## 2024-11-16 RX ADMIN — Medication 25 GRAM(S): at 14:25

## 2024-11-16 NOTE — PROGRESS NOTE ADULT - SUBJECTIVE AND OBJECTIVE BOX
R3 Progress Note    Patient seen and examined at bedside, no acute overnight events. No acute complaints, pain well controlled. Patient is not yet ambulating. Tolerating regular diet. Has not yet passed flatus. Ken is still in place. Denies CP, SOB, N/V, HA, blurred vision, epigastric pain.    Vital Signs Last 24 Hours  T(C): 37.2 (11-16-24 @ 03:00), Max: 37.2 (11-16-24 @ 03:00)  HR: 83 (11-16-24 @ 07:00) (78 - 98)  BP: 123/76 (11-16-24 @ 07:00) (110/69 - 128/74)  RR: 23 (11-16-24 @ 07:00) (12 - 27)  SpO2: 95% (11-16-24 @ 07:00) (93% - 99%)    I&O's Summary    15 Nov 2024 07:01  -  16 Nov 2024 07:00  --------------------------------------------------------  IN: 2050 mL / OUT: 3025 mL / NET: -975 mL        Physical Exam:  General: NAD  Abdomen: Soft, non-tender, non-distended, fundus firm  Incision: Midline vertical incision CDI, subcuticular suture closure  : Ken catheter draining clear yellow urine, no bleeding seen on pad  Pelvic: Lochia wnl    Labs:    Blood Type: O Positive  Antibody Screen: Negative  RPR: Negative               9.3    18.73 )-----------( 266      ( 11-15 @ 23:40 )             28.3                10.1   21.76 )-----------( 298      ( 11-15 @ 20:25 )             30.3                10.5   24.46 )-----------( 295      ( 11-15 @ 16:22 )             31.6         MEDICATIONS  (STANDING):  chlorhexidine 2% Cloths 1 Application(s) Topical <User Schedule>  enoxaparin Injectable 40 milliGRAM(s) SubCutaneous every 24 hours  insulin lispro (ADMELOG) corrective regimen sliding scale   SubCutaneous three times a day before meals  insulin lispro (ADMELOG) corrective regimen sliding scale   SubCutaneous at bedtime    MEDICATIONS  (PRN):  HYDROmorphone  Injectable 0.5 milliGRAM(s) IV Push every 3 hours PRN Moderate Pain (4 - 6)

## 2024-11-16 NOTE — PROGRESS NOTE ADULT - SUBJECTIVE AND OBJECTIVE BOX
Chief Complaint: Diabetes Mellitus follow up    INTERVAL HX:  Patient seen at bedside with OB residents present. Welsh  Cornelius [id number] to communicate   Reports eating well, finishes most food on each tray. nausea/dizziness from pain meds. advised staff.   BG over the last 24 hrs have been within goal range 100-180mg/dl. No hypoglycemia. Now off insulin drip.       Review of Systems:  General: As above  GI: + nausea from pain medication, advised staff, no vomiting  Endocrine: no  S&Sx of hypoglycemia    Allergies    No Known Allergies    Intolerances      MEDICATIONS  (STANDING):  acetaminophen   IVPB .. 1000 milliGRAM(s) IV Intermittent every 6 hours  chlorhexidine 2% Cloths 1 Application(s) Topical <User Schedule>  enoxaparin Injectable 40 milliGRAM(s) SubCutaneous every 24 hours  insulin lispro (ADMELOG) corrective regimen sliding scale   SubCutaneous three times a day before meals  insulin lispro (ADMELOG) corrective regimen sliding scale   SubCutaneous at bedtime  polyethylene glycol 3350 17 Gram(s) Oral daily  senna 2 Tablet(s) Oral at bedtime        insulin lispro (ADMELOG) corrective regimen sliding scale   4 Unit(s) SubCutaneous (11-15-24 @ 23:28)        PHYSICAL EXAM:  VITALS: T(C): 36.5 (11-16-24 @ 12:00)  T(F): 97.7 (11-16-24 @ 12:00), Max: 98.9 (11-16-24 @ 03:00)  HR: 83 (11-16-24 @ 13:10) (78 - 98)  BP: 113/77 (11-16-24 @ 13:10) (110/69 - 149/85)  RR:  (11 - 27)  SpO2:  (95% - 99%)  Wt(kg): --  GENERAL: NAD  Respiratory: Respirations unlabored   Extremities: Warm, no edema  NEURO: Alert , appropriate     LABS:  POCT Blood Glucose.: 138 mg/dL (11-16-24 @ 11:06)  POCT Blood Glucose.: 139 mg/dL (11-16-24 @ 07:50)  POCT Blood Glucose.: 136 mg/dL (11-16-24 @ 05:09)  POCT Blood Glucose.: 143 mg/dL (11-16-24 @ 02:37)  POCT Blood Glucose.: 207 mg/dL (11-15-24 @ 23:21)  POCT Blood Glucose.: 190 mg/dL (11-15-24 @ 22:01)  POCT Blood Glucose.: 161 mg/dL (11-15-24 @ 21:00)  POCT Blood Glucose.: 145 mg/dL (11-15-24 @ 20:01)  POCT Blood Glucose.: 172 mg/dL (11-15-24 @ 18:54)  POCT Blood Glucose.: 141 mg/dL (11-15-24 @ 18:04)  POCT Blood Glucose.: 167 mg/dL (11-15-24 @ 17:12)  POCT Blood Glucose.: 178 mg/dL (11-15-24 @ 16:06)  POCT Blood Glucose.: 168 mg/dL (11-15-24 @ 15:04)  POCT Blood Glucose.: 186 mg/dL (11-15-24 @ 13:55)  POCT Blood Glucose.: 185 mg/dL (11-15-24 @ 12:03)  POCT Blood Glucose.: 125 mg/dL (11-15-24 @ 04:03)  POCT Blood Glucose.: 128 mg/dL (11-15-24 @ 02:37)  POCT Blood Glucose.: 109 mg/dL (11-14-24 @ 22:20)                          10.6   19.87 )-----------( 313      ( 16 Nov 2024 12:47 )             32.0     11-16    133[L]  |  103  |  13  ----------------------------<  137[H]  4.0   |  19[L]  |  0.64    Ca    8.4      16 Nov 2024 12:47  Phos  2.8     11-16  Mg     1.9     11-16    TPro  5.7[L]  /  Alb  2.7[L]  /  TBili  0.2  /  DBili  x   /  AST  17  /  ALT  16  /  AlkPhos  150[H]  11-16    eGFR: 114 mL/min/1.73m2 (16 Nov 2024 12:47)  eGFR: 93 mL/min/1.73m2 (15 Nov 2024 23:40)  eGFR: 98 mL/min/1.73m2 (15 Nov 2024 20:25)  eGFR: 119 mL/min/1.73m2 (15 Nov 2024 16:22)      Thyroid Function Tests:              Diet, Consistent Carbohydrate w/Evening Snack (11-15-24 @ 15:54) [Active]

## 2024-11-16 NOTE — CHART NOTE - NSCHARTNOTESELECT_GEN_ALL_CORE
Post Procedure Check
R3 Bedside Eval
R4 POC/Event Note
R4 Update in Care
Event Note
OB Overnight Rounds
Ob Night /Attending:

## 2024-11-16 NOTE — PROGRESS NOTE ADULT - SUBJECTIVE AND OBJECTIVE BOX
HISTORY  39y Female    24 HOUR EVENTS:    SUBJECTIVE/ROS:  [ ] A ten-point review of systems was otherwise negative except as noted.  [ ] Due to altered mental status/intubation, subjective information were not able to be obtained from the patient. History was obtained, to the extent possible, from review of the chart and collateral sources of information.      NEURO  RASS:     GCS:     CAM ICU:  Exam: awake, alert, oriented  Meds: acetaminophen   IVPB .. 1000 milliGRAM(s) IV Intermittent every 6 hours  HYDROmorphone  Injectable 0.5 milliGRAM(s) IV Push every 3 hours PRN Moderate Pain (4 - 6)    [x] Adequacy of sedation and pain control has been assessed and adjusted      RESPIRATORY  RR: 13 (11-16-24 @ 01:00) (12 - 27)  SpO2: 95% (11-16-24 @ 01:00) (87% - 100%)  Wt(kg): --  Exam: unlabored, clear to auscultation bilaterally  Mechanical Ventilation:   ABG - ( 15 Nov 2024 23:36 )  pH: 7.42  /  pCO2: 29    /  pO2: 101   / HCO3: 19    / Base Excess: -4.8  /  SaO2: 97.9    Lactate: x                [N/A] Extubation Readiness Assessed  Meds:       CARDIOVASCULAR  HR: 87 (11-16-24 @ 01:00) (81 - 116)  BP: 128/68 (11-16-24 @ 01:00) (116/55 - 138/77)  BP(mean): 90 (11-16-24 @ 01:00) (80 - 97)  ABP: 150/150 (11-15-24 @ 21:00) (89/78 - 150/150)  ABP(mean): 150 (11-15-24 @ 21:00) (78 - 150)  Wt(kg): --  CVP(cm H2O): --    Lactate, Blood: 1.4 mmol/L (11-15 @ 07:53)    Exam: regular rate and rhythm  Cardiac Rhythm: sinus  Perfusion     [x]Adequate   [ ]Inadequate  Mentation   [x]Normal       [ ]Reduced  Extremities  [x]Warm         [ ]Cool  Volume Status [ ]Hypervolemic [x]Euvolemic [ ]Hypovolemic  Meds:       GI/NUTRITION  Exam: soft, nontender, nondistended, incision C/D/I  Diet:  Meds:     GENITOURINARY  I&O's Detail    11-14 @ 07:01  -  11-15 @ 07:00  --------------------------------------------------------  IN:    Lactated Ringers: 605 mL    Magnesium Sulfate: 590 mL  Total IN: 1195 mL    OUT:    Blood Loss (mL): 129 mL    Voided (mL): 900 mL  Total OUT: 1029 mL    Total NET: 166 mL      11-15 @ 07:01  -  11-16 @ 02:02  --------------------------------------------------------  IN:    dextrose 5% + sodium chloride 0.45%: 875 mL    Insulin: 4 mL    Insulin: 26 mL    IV PiggyBack: 300 mL    Oral Fluid: 120 mL    Sodium Bicarbonate: 625 mL  Total IN: 1950 mL    OUT:    Sodium Bicarbonate: 0 mL    Voided (mL): 2200 mL  Total OUT: 2200 mL    Total NET: -250 mL        Weight (kg): 93 (11-15 @ 04:13)  11-15    134[L]  |  106  |  12  ----------------------------<  202[H]  4.2   |  18[L]  |  0.82    Ca    7.8[L]      15 Nov 2024 23:40  Phos  3.1     11-15  Mg     2.1     11-15    TPro  4.6[L]  /  Alb  2.3[L]  /  TBili  0.2  /  DBili  x   /  AST  18  /  ALT  13  /  AlkPhos  146[H]  11-15    [ ] Ken catheter, indication: N/A  Meds:       HEMATOLOGIC  Meds: enoxaparin Injectable 40 milliGRAM(s) SubCutaneous every 24 hours    [x] VTE Prophylaxis                        9.3    18.73 )-----------( 266      ( 15 Nov 2024 23:40 )             28.3     PT/INR - ( 15 Nov 2024 23:40 )   PT: 10.6 sec;   INR: 0.92 ratio         PTT - ( 15 Nov 2024 23:40 )  PTT:23.2 sec  Transfusion     [ ] PRBC   [ ] Platelets   [ ] FFP   [ ] Cryoprecipitate      INFECTIOUS DISEASES  WBC Count: 18.73 K/uL (11-15 @ 23:40)  WBC Count: 21.76 K/uL (11-15 @ 20:25)  WBC Count: 24.46 K/uL (11-15 @ 16:22)  WBC Count: 18.81 K/uL (11-15 @ 10:50)  WBC Count: 16.87 K/uL (11-15 @ 07:52)  WBC Count: 16.18 K/uL (11-15 @ 05:07)    RECENT CULTURES:    Meds:       ENDOCRINE  CAPILLARY BLOOD GLUCOSE      POCT Blood Glucose.: 207 mg/dL (15 Nov 2024 23:21)  POCT Blood Glucose.: 190 mg/dL (15 Nov 2024 22:01)  POCT Blood Glucose.: 161 mg/dL (15 Nov 2024 21:00)  POCT Blood Glucose.: 145 mg/dL (15 Nov 2024 20:01)  POCT Blood Glucose.: 172 mg/dL (15 Nov 2024 18:54)  POCT Blood Glucose.: 141 mg/dL (15 Nov 2024 18:04)  POCT Blood Glucose.: 167 mg/dL (15 Nov 2024 17:12)  POCT Blood Glucose.: 178 mg/dL (15 Nov 2024 16:06)  POCT Blood Glucose.: 168 mg/dL (15 Nov 2024 15:04)  POCT Blood Glucose.: 186 mg/dL (15 Nov 2024 13:55)  POCT Blood Glucose.: 185 mg/dL (15 Nov 2024 12:03)  POCT Blood Glucose.: 125 mg/dL (15 Nov 2024 04:03)  POCT Blood Glucose.: 128 mg/dL (15 Nov 2024 02:37)    Meds: insulin lispro (ADMELOG) corrective regimen sliding scale   SubCutaneous every 4 hours        ACCESS DEVICES:  [ ] Peripheral IV  [ ] Central Venous Line	[ ] R	[ ] L	[ ] IJ	[ ] Fem	[ ] SC	Placed:   [ ] Arterial Line		[ ] R	[ ] L	[ ] Fem	[ ] Rad	[ ] Ax	Placed:   [ ] PICC:					[ ] Mediport  [ ] Urinary Catheter, Date Placed:   [x] Necessity of urinary, arterial, and venous catheters discussed    OTHER MEDICATIONS:  chlorhexidine 2% Cloths 1 Application(s) Topical <User Schedule>      CODE STATUS:      IMAGING: HISTORY  39y Female    24 HOUR EVENTS:  - D5 1/2 NS switched to D5 bicarb  - bicarb normalized -> D5 bicarb dc'ed  - consistent carb diet  - holding chemical DVT ppx due to high EBL in OR; consider re-starting in AM  - dc'ed insulin gtt      SUBJECTIVE/ROS:  [ ] A ten-point review of systems was otherwise negative except as noted.  [ ] Due to altered mental status/intubation, subjective information were not able to be obtained from the patient. History was obtained, to the extent possible, from review of the chart and collateral sources of information.      NEURO  RASS:     GCS:     CAM ICU:  Exam: awake, alert, oriented  Meds: acetaminophen   IVPB .. 1000 milliGRAM(s) IV Intermittent every 6 hours  HYDROmorphone  Injectable 0.5 milliGRAM(s) IV Push every 3 hours PRN Moderate Pain (4 - 6)    [x] Adequacy of sedation and pain control has been assessed and adjusted      RESPIRATORY  RR: 13 (11-16-24 @ 01:00) (12 - 27)  SpO2: 95% (11-16-24 @ 01:00) (87% - 100%)  Wt(kg): --  Exam: unlabored, clear to auscultation bilaterally  ABG - ( 15 Nov 2024 23:36 )  pH: 7.42  /  pCO2: 29    /  pO2: 101   / HCO3: 19    / Base Excess: -4.8  /  SaO2: 97.9    Lactate: x                [N/A] Extubation Readiness Assessed  Meds:       CARDIOVASCULAR  HR: 87 (11-16-24 @ 01:00) (81 - 116)  BP: 128/68 (11-16-24 @ 01:00) (116/55 - 138/77)  BP(mean): 90 (11-16-24 @ 01:00) (80 - 97)  ABP: 150/150 (11-15-24 @ 21:00) (89/78 - 150/150)  ABP(mean): 150 (11-15-24 @ 21:00) (78 - 150)  Wt(kg): --  CVP(cm H2O): --    Lactate, Blood: 1.4 mmol/L (11-15 @ 07:53)    Exam: regular rate and rhythm  Cardiac Rhythm: sinus  Perfusion     [x]Adequate   [ ]Inadequate  Mentation   [x]Normal       [ ]Reduced  Extremities  [x]Warm         [ ]Cool  Volume Status [ ]Hypervolemic [x]Euvolemic [ ]Hypovolemic  Meds:       GI/NUTRITION  Exam: soft, nontender, nondistended  Diet: consistent carb    GENITOURINARY  I&O's Detail    11-14 @ 07:01  -  11-15 @ 07:00  --------------------------------------------------------  IN:    Lactated Ringers: 605 mL    Magnesium Sulfate: 590 mL  Total IN: 1195 mL    OUT:    Blood Loss (mL): 129 mL    Voided (mL): 900 mL  Total OUT: 1029 mL    Total NET: 166 mL      11-15 @ 07:01  -  11-16 @ 02:02  --------------------------------------------------------  IN:    dextrose 5% + sodium chloride 0.45%: 875 mL    Insulin: 4 mL    Insulin: 26 mL    IV PiggyBack: 300 mL    Oral Fluid: 120 mL    Sodium Bicarbonate: 625 mL  Total IN: 1950 mL    OUT:    Sodium Bicarbonate: 0 mL    Voided (mL): 2200 mL  Total OUT: 2200 mL    Total NET: -250 mL        Weight (kg): 93 (11-15 @ 04:13)  11-15    134[L]  |  106  |  12  ----------------------------<  202[H]  4.2   |  18[L]  |  0.82    Ca    7.8[L]      15 Nov 2024 23:40  Phos  3.1     11-15  Mg     2.1     11-15    TPro  4.6[L]  /  Alb  2.3[L]  /  TBili  0.2  /  DBili  x   /  AST  18  /  ALT  13  /  AlkPhos  146[H]  11-15    [ ] Ken catheter, indication: N/A  Meds:       HEMATOLOGIC  Meds: enoxaparin Injectable 40 milliGRAM(s) SubCutaneous every 24 hours    [x] VTE Prophylaxis                        9.3    18.73 )-----------( 266      ( 15 Nov 2024 23:40 )             28.3     PT/INR - ( 15 Nov 2024 23:40 )   PT: 10.6 sec;   INR: 0.92 ratio         PTT - ( 15 Nov 2024 23:40 )  PTT:23.2 sec  Transfusion     [ ] PRBC   [ ] Platelets   [ ] FFP   [ ] Cryoprecipitate      INFECTIOUS DISEASES  WBC Count: 18.73 K/uL (11-15 @ 23:40)  WBC Count: 21.76 K/uL (11-15 @ 20:25)  WBC Count: 24.46 K/uL (11-15 @ 16:22)  WBC Count: 18.81 K/uL (11-15 @ 10:50)  WBC Count: 16.87 K/uL (11-15 @ 07:52)  WBC Count: 16.18 K/uL (11-15 @ 05:07)    RECENT CULTURES:    Meds:       ENDOCRINE  CAPILLARY BLOOD GLUCOSE      POCT Blood Glucose.: 207 mg/dL (15 Nov 2024 23:21)  POCT Blood Glucose.: 190 mg/dL (15 Nov 2024 22:01)  POCT Blood Glucose.: 161 mg/dL (15 Nov 2024 21:00)  POCT Blood Glucose.: 145 mg/dL (15 Nov 2024 20:01)  POCT Blood Glucose.: 172 mg/dL (15 Nov 2024 18:54)  POCT Blood Glucose.: 141 mg/dL (15 Nov 2024 18:04)  POCT Blood Glucose.: 167 mg/dL (15 Nov 2024 17:12)  POCT Blood Glucose.: 178 mg/dL (15 Nov 2024 16:06)  POCT Blood Glucose.: 168 mg/dL (15 Nov 2024 15:04)  POCT Blood Glucose.: 186 mg/dL (15 Nov 2024 13:55)  POCT Blood Glucose.: 185 mg/dL (15 Nov 2024 12:03)  POCT Blood Glucose.: 125 mg/dL (15 Nov 2024 04:03)  POCT Blood Glucose.: 128 mg/dL (15 Nov 2024 02:37)    Meds: insulin lispro (ADMELOG) corrective regimen sliding scale   SubCutaneous every 4 hours            OTHER MEDICATIONS:  chlorhexidine 2% Cloths 1 Application(s) Topical <User Schedule>      CODE STATUS: full code

## 2024-11-16 NOTE — PROGRESS NOTE ADULT - SUBJECTIVE AND OBJECTIVE BOX
SURGERY DAILY PROGRESS NOTE    24 Hour/Overnight Events: No acute events overnight    SUBJECTIVE: Pt L groin and legs feeling well, no pain or numbness/tingling, sensation of coldness    ------------------------------------------------------------------------------------------------------------  OBJECTIVE:  Vital Signs Last 24 Hrs  T(C): 37.1 (16 Nov 2024 08:00), Max: 37.2 (16 Nov 2024 03:00)  T(F): 98.8 (16 Nov 2024 08:00), Max: 98.9 (16 Nov 2024 03:00)  HR: 84 (16 Nov 2024 10:00) (78 - 98)  BP: 149/85 (16 Nov 2024 10:00) (110/69 - 149/85)  BP(mean): 110 (16 Nov 2024 10:00) (80 - 110)  RR: 11 (16 Nov 2024 10:00) (11 - 27)  SpO2: 96% (16 Nov 2024 10:00) (93% - 99%)    Parameters below as of 16 Nov 2024 08:00  Patient On (Oxygen Delivery Method): room air      I&O's Detail    15 Nov 2024 07:01  -  16 Nov 2024 07:00  --------------------------------------------------------  IN:    dextrose 5% + sodium chloride 0.45%: 875 mL    Insulin: 4 mL    Insulin: 26 mL    IV PiggyBack: 400 mL    Oral Fluid: 120 mL    Sodium Bicarbonate: 625 mL  Total IN: 2050 mL    OUT:    Indwelling Catheter - Urethral (mL): 1885 mL    Sodium Bicarbonate: 0 mL    Voided (mL): 1140 mL  Total OUT: 3025 mL    Total NET: -975 mL          LABS:                        9.3    18.73 )-----------( 266      ( 15 Nov 2024 23:40 )             28.3     11-15    134[L]  |  106  |  12  ----------------------------<  202[H]  4.2   |  18[L]  |  0.82    Ca    7.8[L]      15 Nov 2024 23:40  Phos  3.1     11-15  Mg     2.1     11-15    TPro  4.6[L]  /  Alb  2.3[L]  /  TBili  0.2  /  DBili  x   /  AST  18  /  ALT  13  /  AlkPhos  146[H]  11-15    LIVER FUNCTIONS - ( 15 Nov 2024 23:40 )  Alb: 2.3 g/dL / Pro: 4.6 g/dL / ALK PHOS: 146 U/L / ALT: 13 U/L / AST: 18 U/L / GGT: x           PT/INR - ( 15 Nov 2024 23:40 )   PT: 10.6 sec;   INR: 0.92 ratio         PTT - ( 15 Nov 2024 23:40 )  PTT:23.2 sec  Urinalysis Basic - ( 15 Nov 2024 23:40 )    Color: x / Appearance: x / SG: x / pH: x  Gluc: 202 mg/dL / Ketone: x  / Bili: x / Urobili: x   Blood: x / Protein: x / Nitrite: x   Leuk Esterase: x / RBC: x / WBC x   Sq Epi: x / Non Sq Epi: x / Bacteria: x        PHYSICAL EXAM:  Constitutional: NAD  Chest: no increased WOB  L Groin: Soft, nontender, no ecchymosis/hematoma, no erythema, no edema.  BL LE Extremities: (+) B/L DP/PT pulses. WWP      Assessment:   39y female POD1 from Reboa sheath placement in L groin during emergent C section, hysterectomy    PLAN:   - L groin site without erythema or hematoma  - BL LE pulses intact  - no further surgical needs  - surgery to sign off, please reach out with further questions    Surgery [id number]

## 2024-11-16 NOTE — OCCUPATIONAL THERAPY INITIAL EVALUATION ADULT - PERTINENT HX OF CURRENT PROBLEM, REHAB EVAL
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 Patient underwent a c section, hysterectomy, BSO with obgyn, and reboa placement with trauma surgery, where the sheath was removed post op. SICU consulted given acidosis noted on post operatively blood gas.

## 2024-11-16 NOTE — PROGRESS NOTE ADULT - TIME BILLING
I have personally seen and examined the patient.  I fully participated in the care of this patient.  I have made amendments to the documentation where necessary, and agree with the history, physical exam and plan as documented   reviewed chart  reviewed laboratory data  reviewed plan of care with resident house staff  Is now off Insulin drip  looks comfortable but still has a metabolic acidoses with low bicarbonate

## 2024-11-16 NOTE — PROGRESS NOTE ADULT - NSPROGADDITIONALINFOA_GEN_ALL_CORE
MFM Fellow Addendum    Patient is a 40 YO  POD1 from  hysterectomy, bilateral salpingectomy (EBL 1000 mL, s/p 2U pRBCs) [facility] Fellow Addendum    [place]  [id number]    Patient is a 40 YO  POD1 from  hysterectomy, bilateral salpingectomy (EBL 1000 mL, s/p 2U pRBCs) for placenta accreta spectrum admitted to the SICU postoperatively for possible euglycemic DKA (although patient has no known diagnosis of pregestational or gestational diabetes). Patient ambulated around room this morning and was seated in a chair at the time of evaluation. She is tolerating a regular diet without nausea/vomiting. Pain is well controlled, although patient reports she feels dizzy and nauseous after receiving IV dilaudid. She has not yet passed flatus or had a bowel movement. Vital signs are within normal limits. Adequate urine output. Abdomen is soft, appropriately tender, no rebound or guarding and incision is well appearing. Hb stable 10.6. Ketoacidosis resolved. Patient is doing well postoperatively. Agree with prophylactic lovenox. Recommend trial of oxycodone in place of dilaudid given symptoms and addition of simethicone. Anticipate transfer out of SICU to antepartum/postpartum floor.    Awa Chauhan MD  [facility] Fellow  Attg: Dr. Rolando Marshall [facility] Fellow Addendum    [place]  [id number]    Patient is a 38 YO  POD1 from  hysterectomy, bilateral salpingectomy (EBL 1000 mL, s/p 2U pRBCs) for placenta accreta spectrum admitted to the SICU postoperatively for possible euglycemic DKA (although patient has no known diagnosis of pregestational or gestational diabetes). Patient ambulated around room this morning and was seated in a chair at the time of evaluation. She is tolerating a regular diet without nausea/vomiting. Pain is well controlled, although patient reports she feels dizzy and nauseous after receiving IV dilaudid. She has not yet passed flatus or had a bowel movement. Vital signs are within normal limits. Adequate urine output. Abdomen is soft, appropriately tender, no rebound or guarding and incision is well appearing. Hb stable 10.6. Ketoacidosis resolved. Patient is doing well postoperatively. Agree with prophylactic lovenox. Recommend trial of oxycodone in place of dilaudid given symptoms and addition of simethicone. For gao catheter removal. Anticipate transfer out of SICU to antepartum/postpartum floor.    Awa Chauhan MD  [facility] Fellow  Attg: Dr. Rolando Marshall

## 2024-11-16 NOTE — PROGRESS NOTE ADULT - ASSESSMENT
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 Patient underwent a c section, hysterectomy, BSO with obgyn, and reboa placement with trauma surgery, where the sheath was removed post op. SICU consulted given acidosis noted on post operatively blood gas.       PLAN:    NEUROLOGIC   - Pain control: IV tylenol, dilaudid PRN     RESPIRATORY   - Monitor SpO2 goal >92%  - Incentive spirometry     CARDIOVASCULAR   - Monitor hemodynamics     GASTROINTESTINAL   - Diet: consistent carb    /RENAL   - Maintain gao catheter, strict Is/Os  - Monitor electrolytes, replete PRN    HEMATOLOGIC  - Monitor H/H   - holding chemical dvt ppx    INFECTIOUS DISEASE  - Monitor fever / WBC    ENDOCRINE  - Monitor gluc   - concern for euglycemic DKA, insulin gtt started intra-op, now dc'ed  - f/u endocrine recs    LINES  - PIV

## 2024-11-16 NOTE — OCCUPATIONAL THERAPY INITIAL EVALUATION ADULT - LIVES WITH, PROFILE
Pt lives with  and children in apartment, +elevator, tub in bathroom. Pt I in ADLs and ambulation prior to admission

## 2024-11-16 NOTE — PROGRESS NOTE ADULT - ASSESSMENT
40yo POD#1 from  hysterectomy @33w5d for 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 Patient received 2U of pRBCs intra-op and is currently being monitored in the SICU for euglycemic DKA given noted beta-hydroxybutyrate. Overnight, DKA resolved and insulin drip discontinued. Patient is progressing appropriately post-operatively.    Neuro: Pain well controlled on IV Tylenol and Dilaudid  CV: Hemodynamically stable. Hct 26->(2u pRBC)->34.1->31.6->30.3->28.3; f/u AM CBC  Pulm: Saturating well on room air  GI: Consistent carb diet  : Ken to gravity. UOP adequate  - s/p Ucath x1  - For Ken removal in AM pending labs  Heme: DVT ppx w/ SCD's while in bed. Currently on Lovenox  ID: Afebrile  Endo: Euglycemic DKA now resolved, monitor FS, patient now s/p insulin gtt  -> Appreciate input by endocrinology  Dispo: Appreciate SICU care    Mercedes Mueller, PGY-3

## 2024-11-16 NOTE — CHART NOTE - NSCHARTNOTEFT_GEN_A_CORE
OB Progress Note    S: 38yo POD#1 s/p  hysterectomy, now admitted to the SICU for euglycemic ketoacidosis. Her pain is well controlled. She reports dizziness during the day that is now resolved. She is tolerating clear liquid diet. Gao in place. Denies N/V. Denies CP/SOB.     O:   Vital Signs Last 24 Hrs  T(C): 37.1 (15 Nov 2024 23:00), Max: 37.1 (15 Nov 2024 23:00)  T(F): 98.7 (15 Nov 2024 23:00), Max: 98.7 (15 Nov 2024 23:00)  HR: 87 (2024 02:) (81 - 116)  BP: 120/63 (:) (116/55 - 138/77)  BP(mean): 86 (:) (80 - 97)  RR: 17 (:) (12 - 27)  SpO2: 96% (:) (87% - 100%)    Parameters below as of 15 Nov 2024 23:00  Patient On (Oxygen Delivery Method): room air        Labs:  Blood type: O Positive  Rubella IgG: RPR: Negative                          9.3[L]   18.73[H] >-----------< 266    ( 11-15 @ 23:40 )             28.3[L]                        10.1[L]   21.76[H] >-----------< 298    ( 11-15 @ 20:25 )             30.3[L]                        10.5[L]   24.46[H] >-----------< 295    ( 11-15 @ 16:22 )             31.6[L]                        10.9[L]   18.81[H] >-----------< 267    ( 11-15 @ 10:50 )             34.1[L]                        8.5[L]   16.87[H] >-----------< 285    ( 11-15 @ 07:52 )             26.2[L]                        11.2[L]   16.18[H] >-----------< 373    ( 11-15 @ 05:07 )             34.2[L]                        10.7[L]   12.89[H] >-----------< 329    (  @ 23:11 )             32.7[L]                        10.2[L]   10.36 >-----------< 305    (  @ 14:46 )             30.9[L]    11-15-24 @ 23:40      134[L]  |  106  |  12  ----------------------------<  202[H]  4.2   |  18[L]  |  0.82    11-15-24 @ 20:25      136  |  108  |  10  ----------------------------<  160[H]  4.2   |  16[L]  |  0.78    11-15-24 @ 16:22      135  |  106  |  9   ----------------------------<  197[H]  4.3   |  13[L]  |  0.54    11-15-24 @ 10:50      137  |  106  |  8   ----------------------------<  179[H]  4.4   |  10[LL]  |  0.48[L]    11-15-24 @ 07:52      137  |  105  |  9   ----------------------------<  152[H]  4.4   |  14[L]  |  0.51    24 @ 23:11      136  |  104  |  8   ----------------------------<  105[H]  4.1   |  15[L]  |  0.53    24 @ 14:46      139  |  106  |  6[L]  ----------------------------<  79  3.6   |  18[L]  |  0.48[L]        Ca    7.8[L]      15 Nov 2024 23:40  Ca    8.1[L]      15 Nov 2024 20:25  Ca    8.0[L]      15 Nov 2024 16:22  Ca    8.2[L]      15 Nov 2024 10:50  Ca    7.7[L]      15 Nov 2024 07:52  Ca    8.8      2024 23:11  Ca    9.2      2024 14:46  Phos  3.1     -15  Phos  3.3     -15  Phos  3.9     -15  Phos  4.8[H]     11-15  Mg     2.1     11-15  Mg     2.2     -15  Mg     2.4     11-15  Mg     2.8[H]     11-15  Mg     4.8[H]     11-15    TPro  4.6[L]  /  Alb  2.3[L]  /  TBili  0.2  /  DBili  x   /  AST  18  /  ALT  13  /  AlkPhos  146[H]  11-15-24 @ 23:40  TPro  5.2[L]  /  Alb  2.5[L]  /  TBili  0.3  /  DBili  x   /  AST  20  /  ALT  14  /  AlkPhos  159[H]  11-15-24 @ 20:25  TPro  5.2[L]  /  Alb  2.6[L]  /  TBili  0.5  /  DBili  x   /  AST  19  /  ALT  16  /  AlkPhos  167[H]  11-15-24 @ 16:22  TPro  5.6[L]  /  Alb  2.6[L]  /  TBili  0.8  /  DBili  x   /  AST  22  /  ALT  19  /  AlkPhos  177[H]  11-15-24 @ 10:50  TPro  5.4[L]  /  Alb  2.5[L]  /  TBili  0.3  /  DBili  x   /  AST  18  /  ALT  17  /  AlkPhos  169[H]  11-15-24 @ 07:52  TPro  6.6  /  Alb  3.1[L]  /  TBili  0.4  /  DBili  x   /  AST  25  /  ALT  23  /  AlkPhos  214[H]  24 @ 23:11  TPro  6.7  /  Alb  3.3  /  TBili  0.3  /  DBili  x   /  AST  25  /  ALT  23  /  AlkPhos  211[H]  24 @ 14:46          PE:  General: NAD  Abdomen: Mildly distended, non tender, midline vertical incision c/d/i with steris and op site in place, tympanic to percussion  Extremities: No erythema, no pitting edema      38yo POD#1 from  hysterectomy @33w5d for 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 Patient received 2U of pRBCs intra-op and is currently being monitored in the SICU for euglycemic DKA given noted beta-hydroxybutyrate. Patient is progressing post-operatively     Neuro: Pain well controlled on IV Tylenol and Dilaudid  CV: Hemodynamically stable. Hct 26->(2u pRBC)->34.1->31.6->30.3->28.9  Pulm: Saturating well on room air  GI: Diet per SICU  : Gao to gravity. UOP adequate  - s/p Ucath x1  - Consider removal of other Ucath in AM vs. removal of gao entirety   Heme: DVT ppx w/ SCD's while in bed. Currently on Lovenox  ID: Afebrile  Endo: Euglycemic DKA now resolved, monitor FS, patient now s/p insulin gtt  -> Appreciate input by endocrinology  Dispo: Appreciate SICU care      Yahaira Benitez, PGY3

## 2024-11-16 NOTE — PROGRESS NOTE ADULT - ASSESSMENT
38yo POD#1 from  hysterectomy @33w5d for 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 Patient received 2U of pRBCs intra-op and is currently being monitored in the SICU for euglycemic DKA given noted beta-hydroxybutyrate. Overnight, DKA resolved and insulin drip discontinued. Patient is progressing appropriately post-operatively.    Neuro: Pain well controlled on IV Tylenol and Dilaudid  CV: Hemodynamically stable. Hct 26->(2u pRBC)->34.1->31.6->30.3->28.3; f/u AM CBC  Pulm: Saturating well on room air  GI: Consistent carb diet  : Ken to gravity. UOP adequate  - s/p Ucath x1  - For Ken removal in AM pending labs  Heme: DVT ppx w/ SCD's while in bed. Currently on Lovenox  ID: Afebrile  Endo: Euglycemic DKA now resolved, monitor FS, patient now s/p insulin gtt  -> Appreciate input by endocrinology  Dispo: Appreciate SICU care    Pt seen with Dr. Hipolito Mueller, PGY-3

## 2024-11-16 NOTE — PROGRESS NOTE ADULT - ATTENDING COMMENTS
patient seen and evaluated with team -  in [place]  patient feeling well - plan for dc gao oob  hct stable  appreciate SICU care

## 2024-11-16 NOTE — PROGRESS NOTE ADULT - ATTENDING COMMENTS
Patient discussed in multidisciplinary conference and overnight events reviewed.     POD1 s/p  and hysterectomy with perioperative REBOA placement and post-op sheath removal. Palpable femoral pulses, no groin swelling, no distal signs of ischemia.     No contraindication for ambulation or out of bed to chair from a groin perspective.   Please call [facility] emergently if patient develops groin pain, groin swelling or a pulsatile groin mass.       Carissa Cramer M.D.  Department of Trauma, Acute Care Surgery and Surgical Critical Care

## 2024-11-16 NOTE — PROGRESS NOTE ADULT - ASSESSMENT
The patient is a 39y Female with PMH of HTN who presented to the hospital for .   Endocrinology consulted for concern for euglycemic DKA-now resolved.     #Steroid-induced hyperglycemia  #Euglycemic DKA  - Follows with: PCP  - eGFR: 123 mL/min/1.73m2 (11-15-24)  - Weight (kg): 93 (11-15-24)  - glucose above goal, BHB 4.2, bicarb 10, pH 7.23  - betamethasone X2  Technically, a diagnosis of euglycemic DKA requires a diagnosis of diabetes. This is unclear in this patient. Her fingersticks are higher than expected but she denies history of GDM or T2DM. Unclear what level of prenatal care she received.    INPATIENT PLAN:  - Insulin drip stopped last night as euglycemic DKA resolved.   - No longer needs q4 labs.  - Continue consistent carbohydrate diet.   - Continue checking BG before meals and at bedtime  - Continue with low-dose admelog correction with meals and separate correction at bedtime only  - A1C will not be reliable in post-partum state, especially as patient received 2 units PRBC in the OR.     DISCHARGE PLANNING:  - Discharge recs pending clinical course  - will need Endocrinology follow up.   -Can follow up at [facility] [address] [place] [address] [phone]    #Hypertension  - Goal BP <130/80  - Management as per primary team  - check urine albumin level as outpatient    Contact via Microsoft Teams during business hours  To reach covering provider access AMION via sunrise tools  For Urgent matters/after-hours/weekends/holidays please page endocrine fellow on call   For nonurgent matters please email [email or web address]    Please note that this patient may be followed by different provider tomorrow.  Notify endocrine 24 hours prior to discharge for final recommendations

## 2024-11-17 LAB
ALBUMIN SERPL ELPH-MCNC: 2.7 G/DL — LOW (ref 3.3–5)
ALP SERPL-CCNC: 138 U/L — HIGH (ref 40–120)
ALT FLD-CCNC: 13 U/L — SIGNIFICANT CHANGE UP (ref 10–45)
ANION GAP SERPL CALC-SCNC: 12 MMOL/L — SIGNIFICANT CHANGE UP (ref 5–17)
AST SERPL-CCNC: 17 U/L — SIGNIFICANT CHANGE UP (ref 10–40)
BASOPHILS # BLD AUTO: 0.03 K/UL — SIGNIFICANT CHANGE UP (ref 0–0.2)
BASOPHILS NFR BLD AUTO: 0.2 % — SIGNIFICANT CHANGE UP (ref 0–2)
BILIRUB SERPL-MCNC: 0.2 MG/DL — SIGNIFICANT CHANGE UP (ref 0.2–1.2)
BUN SERPL-MCNC: 10 MG/DL — SIGNIFICANT CHANGE UP (ref 7–23)
CALCIUM SERPL-MCNC: 8.5 MG/DL — SIGNIFICANT CHANGE UP (ref 8.4–10.5)
CHLORIDE SERPL-SCNC: 106 MMOL/L — SIGNIFICANT CHANGE UP (ref 96–108)
CO2 SERPL-SCNC: 21 MMOL/L — LOW (ref 22–31)
CREAT SERPL-MCNC: 0.55 MG/DL — SIGNIFICANT CHANGE UP (ref 0.5–1.3)
EGFR: 119 ML/MIN/1.73M2 — SIGNIFICANT CHANGE UP
EOSINOPHIL # BLD AUTO: 0.02 K/UL — SIGNIFICANT CHANGE UP (ref 0–0.5)
EOSINOPHIL NFR BLD AUTO: 0.1 % — SIGNIFICANT CHANGE UP (ref 0–6)
GLUCOSE BLDC GLUCOMTR-MCNC: 144 MG/DL — HIGH (ref 70–99)
GLUCOSE BLDC GLUCOMTR-MCNC: 168 MG/DL — HIGH (ref 70–99)
GLUCOSE BLDC GLUCOMTR-MCNC: 96 MG/DL — SIGNIFICANT CHANGE UP (ref 70–99)
GLUCOSE BLDC GLUCOMTR-MCNC: 99 MG/DL — SIGNIFICANT CHANGE UP (ref 70–99)
GLUCOSE SERPL-MCNC: 80 MG/DL — SIGNIFICANT CHANGE UP (ref 70–99)
HCT VFR BLD CALC: 30.9 % — LOW (ref 34.5–45)
HGB BLD-MCNC: 10.1 G/DL — LOW (ref 11.5–15.5)
HIV 1+2 AB+HIV1 P24 AG SERPL QL IA: SIGNIFICANT CHANGE UP
IMM GRANULOCYTES NFR BLD AUTO: 0.9 % — SIGNIFICANT CHANGE UP (ref 0–0.9)
LYMPHOCYTES # BLD AUTO: 18.1 % — SIGNIFICANT CHANGE UP (ref 13–44)
LYMPHOCYTES # BLD AUTO: 2.48 K/UL — SIGNIFICANT CHANGE UP (ref 1–3.3)
MCHC RBC-ENTMCNC: 29.4 PG — SIGNIFICANT CHANGE UP (ref 27–34)
MCHC RBC-ENTMCNC: 32.7 G/DL — SIGNIFICANT CHANGE UP (ref 32–36)
MCV RBC AUTO: 89.8 FL — SIGNIFICANT CHANGE UP (ref 80–100)
MONOCYTES # BLD AUTO: 1.13 K/UL — HIGH (ref 0–0.9)
MONOCYTES NFR BLD AUTO: 8.2 % — SIGNIFICANT CHANGE UP (ref 2–14)
NEUTROPHILS # BLD AUTO: 9.94 K/UL — HIGH (ref 1.8–7.4)
NEUTROPHILS NFR BLD AUTO: 72.5 % — SIGNIFICANT CHANGE UP (ref 43–77)
NRBC # BLD: 0 /100 WBCS — SIGNIFICANT CHANGE UP (ref 0–0)
PLATELET # BLD AUTO: 275 K/UL — SIGNIFICANT CHANGE UP (ref 150–400)
POTASSIUM SERPL-MCNC: 3.6 MMOL/L — SIGNIFICANT CHANGE UP (ref 3.5–5.3)
POTASSIUM SERPL-SCNC: 3.6 MMOL/L — SIGNIFICANT CHANGE UP (ref 3.5–5.3)
PROT SERPL-MCNC: 5.4 G/DL — LOW (ref 6–8.3)
RBC # BLD: 3.44 M/UL — LOW (ref 3.8–5.2)
RBC # FLD: 14.5 % — SIGNIFICANT CHANGE UP (ref 10.3–14.5)
SODIUM SERPL-SCNC: 139 MMOL/L — SIGNIFICANT CHANGE UP (ref 135–145)
WBC # BLD: 13.72 K/UL — HIGH (ref 3.8–10.5)
WBC # FLD AUTO: 13.72 K/UL — HIGH (ref 3.8–10.5)

## 2024-11-17 RX ORDER — IBUPROFEN 200 MG
600 TABLET ORAL EVERY 6 HOURS
Refills: 0 | Status: DISCONTINUED | OUTPATIENT
Start: 2024-11-17 | End: 2024-11-19

## 2024-11-17 RX ORDER — ACETAMINOPHEN 500MG 500 MG/1
975 TABLET, COATED ORAL EVERY 6 HOURS
Refills: 0 | Status: DISCONTINUED | OUTPATIENT
Start: 2024-11-17 | End: 2024-11-19

## 2024-11-17 RX ADMIN — Medication 600 MILLIGRAM(S): at 17:25

## 2024-11-17 RX ADMIN — OXYCODONE HYDROCHLORIDE 5 MILLIGRAM(S): 30 TABLET ORAL at 16:40

## 2024-11-17 RX ADMIN — OXYCODONE HYDROCHLORIDE 5 MILLIGRAM(S): 30 TABLET ORAL at 17:25

## 2024-11-17 RX ADMIN — Medication 600 MILLIGRAM(S): at 17:55

## 2024-11-17 RX ADMIN — ACETAMINOPHEN 500MG 975 MILLIGRAM(S): 500 TABLET, COATED ORAL at 20:29

## 2024-11-17 RX ADMIN — Medication 80 MILLIGRAM(S): at 08:14

## 2024-11-17 RX ADMIN — OXYCODONE HYDROCHLORIDE 5 MILLIGRAM(S): 30 TABLET ORAL at 20:30

## 2024-11-17 RX ADMIN — Medication 2 TABLET(S): at 20:35

## 2024-11-17 RX ADMIN — Medication 80 MILLIGRAM(S): at 16:40

## 2024-11-17 RX ADMIN — OXYCODONE HYDROCHLORIDE 5 MILLIGRAM(S): 30 TABLET ORAL at 11:59

## 2024-11-17 RX ADMIN — ACETAMINOPHEN 500MG 1000 MILLIGRAM(S): 500 TABLET, COATED ORAL at 01:00

## 2024-11-17 RX ADMIN — ACETAMINOPHEN 500MG 400 MILLIGRAM(S): 500 TABLET, COATED ORAL at 00:02

## 2024-11-17 RX ADMIN — OXYCODONE HYDROCHLORIDE 10 MILLIGRAM(S): 30 TABLET ORAL at 04:22

## 2024-11-17 RX ADMIN — Medication 600 MILLIGRAM(S): at 12:54

## 2024-11-17 RX ADMIN — OXYCODONE HYDROCHLORIDE 5 MILLIGRAM(S): 30 TABLET ORAL at 23:28

## 2024-11-17 RX ADMIN — ACETAMINOPHEN 500MG 975 MILLIGRAM(S): 500 TABLET, COATED ORAL at 20:36

## 2024-11-17 RX ADMIN — ACETAMINOPHEN 500MG 975 MILLIGRAM(S): 500 TABLET, COATED ORAL at 15:25

## 2024-11-17 RX ADMIN — ACETAMINOPHEN 500MG 400 MILLIGRAM(S): 500 TABLET, COATED ORAL at 06:23

## 2024-11-17 RX ADMIN — ENOXAPARIN SODIUM 40 MILLIGRAM(S): 30 INJECTION SUBCUTANEOUS at 08:12

## 2024-11-17 RX ADMIN — OXYCODONE HYDROCHLORIDE 5 MILLIGRAM(S): 30 TABLET ORAL at 10:59

## 2024-11-17 RX ADMIN — ACETAMINOPHEN 500MG 1000 MILLIGRAM(S): 500 TABLET, COATED ORAL at 06:32

## 2024-11-17 RX ADMIN — OXYCODONE HYDROCHLORIDE 5 MILLIGRAM(S): 30 TABLET ORAL at 21:00

## 2024-11-17 RX ADMIN — Medication 600 MILLIGRAM(S): at 23:28

## 2024-11-17 RX ADMIN — Medication 600 MILLIGRAM(S): at 11:54

## 2024-11-17 RX ADMIN — POLYETHYLENE GLYCOL 3350 17 GRAM(S): 17 POWDER, FOR SOLUTION ORAL at 09:13

## 2024-11-17 RX ADMIN — ACETAMINOPHEN 500MG 975 MILLIGRAM(S): 500 TABLET, COATED ORAL at 14:36

## 2024-11-17 NOTE — PROVIDER CONTACT NOTE (OTHER) - RECOMMENDATIONS
Patient re-educated via Translation i-pad to call to have fingerstick checked before all meals and HS. Patient verbalized an understanding. Will check fingerstick pre dinner.

## 2024-11-17 NOTE — PROGRESS NOTE ADULT - ASSESSMENT
1yo POD#2 from  hysterectomy @33w5d for 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 Patient received 2U of pRBCs intra-op and is currently being monitored in the SICU for euglycemic DKA given noted beta-hydroxybutyrate. Overnight, DKA resolved and insulin drip discontinued. Patient is progressing appropriately post-operatively.    Neuro: Pain well controlled on IV Tylenol, Oxy PRN, and Dilaudid PRN  CV: Hemodynamically stable. Hct 26->(2u pRBC)->34.1->31.6->30.3->28.3->32.0; f/u AM CBC  Pulm: Saturating well on room air  GI: Consistent carb diet  - Senna, Simethicone PRN   : Ken to gravity. UOP adequate  - s/p Ucath  - voiding spontaneously   Heme: DVT ppx w/ SCD's while in bed. 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 Patient received 2U of pRBCs intra-op and was in the SICU for euglycemic DKA given noted beta-hydroxybutyrate. DKA resolved and insulin drip discontinued, downgraded to postpartum floor yesterday. Patient is hemodynamically stable. Reporting gassy distention, nausea, and vomiting.     Neuro: Pain well controlled on IV Tylenol, Oxy PRN, and Dilaudid PRN  CV: Hemodynamically stable. Hct 26->(2u pRBC)->34.1->31.6->30.3->28.3->32.0; f/u AM CBC  Pulm: Saturating well on room air  GI: Consistent carb diet  - Senna, Simethicone PRN  - Continue to monitor for nausea/vomiting/flatus. Consider imaging to rule out ileus vs. SBO if patient continuing to not tolerate PO today.   :  - s/p Ucath  - voiding spontaneously   Heme: DVT ppx w/ SCD's while in bed. Currently on Lovenox  ID: Afebrile  Endo: Euglycemic DKA now resolved, monitor FS, patient now s/p insulin gtt  - c/w ISS   Dispo: Continue inpatient care     Radha Woods, PGY-2

## 2024-11-17 NOTE — PROGRESS NOTE ADULT - ATTENDING COMMENTS
pt seen and evaluated and agree with the details as noted in the above note  POD # 2  s: pt is sitting in the chair. reports mild incisional pain. This Am denies any vomiting but has not eaten yet this am. Neg flatus, neg bm  vss afebrile  abd: vertical skin inicision, clean, dry, intact, no drainage. + midly distended. + BS  ext no calf tend  pelvic no bleeding  cbc:  wbc 13.7, h/h 10.1/30.9  plt 275  a/p  pod # 2  s/p c-hysterectomy secondary to placenta Acrreta, s/p 2 u PRBC  s/p euglycemic DKA - resolved. currently stable  abd moderately distended, + BS. pt feels hungry this am. will advance diet as tolerated and monitor closely for any signs of ileus  mild anemia - asymptomatic  continue routine post op care  Dr. Ace

## 2024-11-17 NOTE — PROGRESS NOTE ADULT - SUBJECTIVE AND OBJECTIVE BOX
Progress Note    Patient seen and examined at bedside, no acute overnight events. No acute complaints, pain well controlled. +OOB. Tolerating regular diet. Passing flatus. Voiding spontaneously. Denies CP, SOB, N/V, HA, blurred vision, epigastric pain.      O:  Vitals:  Vital Signs Last 24 Hrs  T(C): 36.8 (17 Nov 2024 01:08), Max: 37.1 (16 Nov 2024 08:00)  T(F): 98.2 (17 Nov 2024 01:08), Max: 98.8 (16 Nov 2024 08:00)  HR: 77 (17 Nov 2024 01:08) (75 - 96)  BP: 119/82 (17 Nov 2024 01:08) (110/69 - 153/79)  BP(mean): 94 (16 Nov 2024 20:00) (86 - 110)  RR: 17 (17 Nov 2024 01:08) (11 - 23)  SpO2: 95% (17 Nov 2024 01:08) (93% - 97%)    Parameters below as of 17 Nov 2024 01:08  Patient On (Oxygen Delivery Method): room air        MEDICATIONS  (STANDING):  acetaminophen   IVPB .. 1000 milliGRAM(s) IV Intermittent every 6 hours  chlorhexidine 2% Cloths 1 Application(s) Topical <User Schedule>  enoxaparin Injectable 40 milliGRAM(s) SubCutaneous every 24 hours  insulin lispro (ADMELOG) corrective regimen sliding scale   SubCutaneous three times a day before meals  insulin lispro (ADMELOG) corrective regimen sliding scale   SubCutaneous at bedtime  polyethylene glycol 3350 17 Gram(s) Oral daily  senna 2 Tablet(s) Oral at bedtime    MEDICATIONS  (PRN):  HYDROmorphone  Injectable 0.5 milliGRAM(s) IV Push every 3 hours PRN Breakthrough Pain  oxyCODONE    IR 5 milliGRAM(s) Oral every 4 hours PRN Moderate Pain (4 - 6)  oxyCODONE    IR 10 milliGRAM(s) Oral every 4 hours PRN Severe Pain (7 - 10)  simethicone 80 milliGRAM(s) Chew every 6 hours PRN Indigestion      LABS:  Blood type: O Positive  Rubella IgG: RPR: Negative                          10.6[L]   19.87[H] >-----------< 313    ( 11-16 @ 12:47 )             32.0[L]                        9.3[L]   18.73[H] >-----------< 266    ( 11-15 @ 23:40 )             28.3[L]                        10.1[L]   21.76[H] >-----------< 298    ( 11-15 @ 20:25 )             30.3[L]                        10.5[L]   24.46[H] >-----------< 295    ( 11-15 @ 16:22 )             31.6[L]                        10.9[L]   18.81[H] >-----------< 267    ( 11-15 @ 10:50 )             34.1[L]                        8.5[L]   16.87[H] >-----------< 285    ( 11-15 @ 07:52 )             26.2[L]                        11.2[L]   16.18[H] >-----------< 373    ( 11-15 @ 05:07 )             34.2[L]                        10.7[L]   12.89[H] >-----------< 329    ( 11-14 @ 23:11 )             32.7[L]                        10.2[L]   10.36 >-----------< 305    ( 11-14 @ 14:46 )             30.9[L]    11-16-24 @ 12:47      133[L]  |  103  |  13  ----------------------------<  137[H]  4.0   |  19[L]  |  0.64    11-15-24 @ 23:40      134[L]  |  106  |  12  ----------------------------<  202[H]  4.2   |  18[L]  |  0.82    11-15-24 @ 20:25      136  |  108  |  10  ----------------------------<  160[H]  4.2   |  16[L]  |  0.78    11-15-24 @ 16:22      135  |  106  |  9   ----------------------------<  197[H]  4.3   |  13[L]  |  0.54    11-15-24 @ 10:50      137  |  106  |  8   ----------------------------<  179[H]  4.4   |  10[LL]  |  0.48[L]    11-15-24 @ 07:52      137  |  105  |  9   ----------------------------<  152[H]  4.4   |  14[L]  |  0.51    11-14-24 @ 23:11      136  |  104  |  8   ----------------------------<  105[H]  4.1   |  15[L]  |  0.53    11-14-24 @ 14:46      139  |  106  |  6[L]  ----------------------------<  79  3.6   |  18[L]  |  0.48[L]        Ca    8.4      16 Nov 2024 12:47  Ca    7.8[L]      15 Nov 2024 23:40  Ca    8.1[L]      15 Nov 2024 20:25  Ca    8.0[L]      15 Nov 2024 16:22  Ca    8.2[L]      15 Nov 2024 10:50  Ca    7.7[L]      15 Nov 2024 07:52  Ca    8.8      14 Nov 2024 23:11  Ca    9.2      14 Nov 2024 14:46  Phos  2.8     11-16  Phos  3.1     11-15  Phos  3.3     11-15  Phos  3.9     11-15  Phos  4.8[H]     11-15  Mg     1.9     11-16  Mg     2.1     11-15  Mg     2.2     11-15  Mg     2.4     11-15  Mg     2.8[H]     11-15  Mg     4.8[H]     11-15    TPro  5.7[L]  /  Alb  2.7[L]  /  TBili  0.2  /  DBili  x   /  AST  17  /  ALT  16  /  AlkPhos  150[H]  11-16-24 @ 12:47  TPro  4.6[L]  /  Alb  2.3[L]  /  TBili  0.2  /  DBili  x   /  AST  18  /  ALT  13  /  AlkPhos  146[H]  11-15-24 @ 23:40  TPro  5.2[L]  /  Alb  2.5[L]  /  TBili  0.3  /  DBili  x   /  AST  20  /  ALT  14  /  AlkPhos  159[H]  11-15-24 @ 20:25  TPro  5.2[L]  /  Alb  2.6[L]  /  TBili  0.5  /  DBili  x   /  AST  19  /  ALT  16  /  AlkPhos  167[H]  11-15-24 @ 16:22  TPro  5.6[L]  /  Alb  2.6[L]  /  TBili  0.8  /  DBili  x   /  AST  22  /  ALT  19  /  AlkPhos  177[H]  11-15-24 @ 10:50  TPro  5.4[L]  /  Alb  2.5[L]  /  TBili  0.3  /  DBili  x   /  AST  18  /  ALT  17  /  AlkPhos  169[H]  11-15-24 @ 07:52  TPro  6.6  /  Alb  3.1[L]  /  TBili  0.4  /  DBili  x   /  AST  25  /  ALT  23  /  AlkPhos  214[H]  11-14-24 @ 23:11  TPro  6.7  /  Alb  3.3  /  TBili  0.3  /  DBili  x   /  AST  25  /  ALT  23  /  AlkPhos  211[H]  11-14-24 @ 14:46      Physical Exam:  General: NAD  Abdomen: Soft, non-tender, non-distended, fundus firm  Incision: Midline vertical incision CDI, subcuticular suture closure  : no bleeding seen on pad           Progress Note    [place]  #[id number]    Patient seen and examined at bedside, no acute overnight events.  Patient reports moderate pain at this time, only somewhat relieved by pain medications.  Is not passing flatus and has not had a BM.  Additionally reports nausea and vomiting yesterday (1 episode after breakfast and 1 episode after lunch). States she did not eat dinner for fear of vomiting. Otherwise continuing to drink water without nausea or vomiting.   +OOB. Voiding spontaneously. Denies CP, SOB, N/V, HA, blurred vision, epigastric pain.      O:  Vitals:  Vital Signs Last 24 Hrs  T(C): 36.8 (17 Nov 2024 01:08), Max: 37.1 (16 Nov 2024 08:00)  T(F): 98.2 (17 Nov 2024 01:08), Max: 98.8 (16 Nov 2024 08:00)  HR: 77 (17 Nov 2024 01:08) (75 - 96)  BP: 119/82 (17 Nov 2024 01:08) (110/69 - 153/79)  BP(mean): 94 (16 Nov 2024 20:00) (86 - 110)  RR: 17 (17 Nov 2024 01:08) (11 - 23)  SpO2: 95% (17 Nov 2024 01:08) (93% - 97%)    Parameters below as of 17 Nov 2024 01:08  Patient On (Oxygen Delivery Method): room air        MEDICATIONS  (STANDING):  acetaminophen   IVPB .. 1000 milliGRAM(s) IV Intermittent every 6 hours  chlorhexidine 2% Cloths 1 Application(s) Topical <User Schedule>  enoxaparin Injectable 40 milliGRAM(s) SubCutaneous every 24 hours  insulin lispro (ADMELOG) corrective regimen sliding scale   SubCutaneous three times a day before meals  insulin lispro (ADMELOG) corrective regimen sliding scale   SubCutaneous at bedtime  polyethylene glycol 3350 17 Gram(s) Oral daily  senna 2 Tablet(s) Oral at bedtime    MEDICATIONS  (PRN):  HYDROmorphone  Injectable 0.5 milliGRAM(s) IV Push every 3 hours PRN Breakthrough Pain  oxyCODONE    IR 5 milliGRAM(s) Oral every 4 hours PRN Moderate Pain (4 - 6)  oxyCODONE    IR 10 milliGRAM(s) Oral every 4 hours PRN Severe Pain (7 - 10)  simethicone 80 milliGRAM(s) Chew every 6 hours PRN Indigestion      LABS:  Blood type: O Positive  Rubella IgG: RPR: Negative                          10.6[L]   19.87[H] >-----------< 313    ( 11-16 @ 12:47 )             32.0[L]                        9.3[L]   18.73[H] >-----------< 266    ( 11-15 @ 23:40 )             28.3[L]                        10.1[L]   21.76[H] >-----------< 298    ( 11-15 @ 20:25 )             30.3[L]                        10.5[L]   24.46[H] >-----------< 295    ( 11-15 @ 16:22 )             31.6[L]                        10.9[L]   18.81[H] >-----------< 267    ( 11-15 @ 10:50 )             34.1[L]                        8.5[L]   16.87[H] >-----------< 285    ( 11-15 @ 07:52 )             26.2[L]                        11.2[L]   16.18[H] >-----------< 373    ( 11-15 @ 05:07 )             34.2[L]                        10.7[L]   12.89[H] >-----------< 329    ( 11-14 @ 23:11 )             32.7[L]                        10.2[L]   10.36 >-----------< 305    ( 11-14 @ 14:46 )             30.9[L]    11-16-24 @ 12:47      133[L]  |  103  |  13  ----------------------------<  137[H]  4.0   |  19[L]  |  0.64    11-15-24 @ 23:40      134[L]  |  106  |  12  ----------------------------<  202[H]  4.2   |  18[L]  |  0.82    11-15-24 @ 20:25      136  |  108  |  10  ----------------------------<  160[H]  4.2   |  16[L]  |  0.78    11-15-24 @ 16:22      135  |  106  |  9   ----------------------------<  197[H]  4.3   |  13[L]  |  0.54    11-15-24 @ 10:50      137  |  106  |  8   ----------------------------<  179[H]  4.4   |  10[LL]  |  0.48[L]    11-15-24 @ 07:52      137  |  105  |  9   ----------------------------<  152[H]  4.4   |  14[L]  |  0.51    11-14-24 @ 23:11      136  |  104  |  8   ----------------------------<  105[H]  4.1   |  15[L]  |  0.53    11-14-24 @ 14:46      139  |  106  |  6[L]  ----------------------------<  79  3.6   |  18[L]  |  0.48[L]        Ca    8.4      16 Nov 2024 12:47  Ca    7.8[L]      15 Nov 2024 23:40  Ca    8.1[L]      15 Nov 2024 20:25  Ca    8.0[L]      15 Nov 2024 16:22  Ca    8.2[L]      15 Nov 2024 10:50  Ca    7.7[L]      15 Nov 2024 07:52  Ca    8.8      14 Nov 2024 23:11  Ca    9.2      14 Nov 2024 14:46  Phos  2.8     11-16  Phos  3.1     11-15  Phos  3.3     11-15  Phos  3.9     11-15  Phos  4.8[H]     11-15  Mg     1.9     11-16  Mg     2.1     11-15  Mg     2.2     11-15  Mg     2.4     11-15  Mg     2.8[H]     11-15  Mg     4.8[H]     11-15    TPro  5.7[L]  /  Alb  2.7[L]  /  TBili  0.2  /  DBili  x   /  AST  17  /  ALT  16  /  AlkPhos  150[H]  11-16-24 @ 12:47  TPro  4.6[L]  /  Alb  2.3[L]  /  TBili  0.2  /  DBili  x   /  AST  18  /  ALT  13  /  AlkPhos  146[H]  11-15-24 @ 23:40  TPro  5.2[L]  /  Alb  2.5[L]  /  TBili  0.3  /  DBili  x   /  AST  20  /  ALT  14  /  AlkPhos  159[H]  11-15-24 @ 20:25  TPro  5.2[L]  /  Alb  2.6[L]  /  TBili  0.5  /  DBili  x   /  AST  19  /  ALT  16  /  AlkPhos  167[H]  11-15-24 @ 16:22  TPro  5.6[L]  /  Alb  2.6[L]  /  TBili  0.8  /  DBili  x   /  AST  22  /  ALT  19  /  AlkPhos  177[H]  11-15-24 @ 10:50  TPro  5.4[L]  /  Alb  2.5[L]  /  TBili  0.3  /  DBili  x   /  AST  18  /  ALT  17  /  AlkPhos  169[H]  11-15-24 @ 07:52  TPro  6.6  /  Alb  3.1[L]  /  TBili  0.4  /  DBili  x   /  AST  25  /  ALT  23  /  AlkPhos  214[H]  11-14-24 @ 23:11  TPro  6.7  /  Alb  3.3  /  TBili  0.3  /  DBili  x   /  AST  25  /  ALT  23  /  AlkPhos  211[H]  11-14-24 @ 14:46      Physical Exam:  General: NAD  Abdomen: Soft, moderately distended. Tympanitic. Diffuse abdominal tenderness. No rebound or guarding.  Incision: Midline vertical incision CDI, subcuticular suture closure  : no bleeding seen on pad  Extremities: No calf tenderness bilaterally.

## 2024-11-18 LAB
BASOPHILS # BLD AUTO: 0.02 K/UL — SIGNIFICANT CHANGE UP (ref 0–0.2)
BASOPHILS NFR BLD AUTO: 0.2 % — SIGNIFICANT CHANGE UP (ref 0–2)
EOSINOPHIL # BLD AUTO: 0.08 K/UL — SIGNIFICANT CHANGE UP (ref 0–0.5)
EOSINOPHIL NFR BLD AUTO: 0.8 % — SIGNIFICANT CHANGE UP (ref 0–6)
GLUCOSE BLDC GLUCOMTR-MCNC: 119 MG/DL — HIGH (ref 70–99)
GLUCOSE BLDC GLUCOMTR-MCNC: 130 MG/DL — HIGH (ref 70–99)
GLUCOSE BLDC GLUCOMTR-MCNC: 137 MG/DL — HIGH (ref 70–99)
GLUCOSE BLDC GLUCOMTR-MCNC: 85 MG/DL — SIGNIFICANT CHANGE UP (ref 70–99)
HCT VFR BLD CALC: 29.4 % — LOW (ref 34.5–45)
HGB BLD-MCNC: 9.7 G/DL — LOW (ref 11.5–15.5)
IMM GRANULOCYTES NFR BLD AUTO: 0.8 % — SIGNIFICANT CHANGE UP (ref 0–0.9)
LYMPHOCYTES # BLD AUTO: 1.86 K/UL — SIGNIFICANT CHANGE UP (ref 1–3.3)
LYMPHOCYTES # BLD AUTO: 17.5 % — SIGNIFICANT CHANGE UP (ref 13–44)
MCHC RBC-ENTMCNC: 29.8 PG — SIGNIFICANT CHANGE UP (ref 27–34)
MCHC RBC-ENTMCNC: 33 G/DL — SIGNIFICANT CHANGE UP (ref 32–36)
MCV RBC AUTO: 90.2 FL — SIGNIFICANT CHANGE UP (ref 80–100)
MONOCYTES # BLD AUTO: 0.87 K/UL — SIGNIFICANT CHANGE UP (ref 0–0.9)
MONOCYTES NFR BLD AUTO: 8.2 % — SIGNIFICANT CHANGE UP (ref 2–14)
NEUTROPHILS # BLD AUTO: 7.68 K/UL — HIGH (ref 1.8–7.4)
NEUTROPHILS NFR BLD AUTO: 72.5 % — SIGNIFICANT CHANGE UP (ref 43–77)
NRBC # BLD: 0 /100 WBCS — SIGNIFICANT CHANGE UP (ref 0–0)
PLATELET # BLD AUTO: 267 K/UL — SIGNIFICANT CHANGE UP (ref 150–400)
RBC # BLD: 3.26 M/UL — LOW (ref 3.8–5.2)
RBC # FLD: 14.3 % — SIGNIFICANT CHANGE UP (ref 10.3–14.5)
WBC # BLD: 10.6 K/UL — HIGH (ref 3.8–10.5)
WBC # FLD AUTO: 10.6 K/UL — HIGH (ref 3.8–10.5)

## 2024-11-18 PROCEDURE — 99232 SBSQ HOSP IP/OBS MODERATE 35: CPT

## 2024-11-18 RX ADMIN — ACETAMINOPHEN 500MG 975 MILLIGRAM(S): 500 TABLET, COATED ORAL at 05:37

## 2024-11-18 RX ADMIN — OXYCODONE HYDROCHLORIDE 5 MILLIGRAM(S): 30 TABLET ORAL at 05:46

## 2024-11-18 RX ADMIN — ACETAMINOPHEN 500MG 975 MILLIGRAM(S): 500 TABLET, COATED ORAL at 09:03

## 2024-11-18 RX ADMIN — ENOXAPARIN SODIUM 40 MILLIGRAM(S): 30 INJECTION SUBCUTANEOUS at 09:03

## 2024-11-18 RX ADMIN — Medication 600 MILLIGRAM(S): at 18:03

## 2024-11-18 RX ADMIN — POLYETHYLENE GLYCOL 3350 17 GRAM(S): 17 POWDER, FOR SOLUTION ORAL at 11:52

## 2024-11-18 RX ADMIN — ACETAMINOPHEN 500MG 975 MILLIGRAM(S): 500 TABLET, COATED ORAL at 10:00

## 2024-11-18 RX ADMIN — OXYCODONE HYDROCHLORIDE 10 MILLIGRAM(S): 30 TABLET ORAL at 00:01

## 2024-11-18 RX ADMIN — ACETAMINOPHEN 500MG 975 MILLIGRAM(S): 500 TABLET, COATED ORAL at 14:38

## 2024-11-18 RX ADMIN — Medication 600 MILLIGRAM(S): at 05:46

## 2024-11-18 RX ADMIN — ACETAMINOPHEN 500MG 975 MILLIGRAM(S): 500 TABLET, COATED ORAL at 15:30

## 2024-11-18 RX ADMIN — ACETAMINOPHEN 500MG 975 MILLIGRAM(S): 500 TABLET, COATED ORAL at 03:41

## 2024-11-18 RX ADMIN — OXYCODONE HYDROCHLORIDE 5 MILLIGRAM(S): 30 TABLET ORAL at 07:00

## 2024-11-18 RX ADMIN — Medication 600 MILLIGRAM(S): at 11:52

## 2024-11-18 RX ADMIN — Medication 600 MILLIGRAM(S): at 12:48

## 2024-11-18 RX ADMIN — Medication 2 TABLET(S): at 23:18

## 2024-11-18 RX ADMIN — Medication 600 MILLIGRAM(S): at 07:00

## 2024-11-18 RX ADMIN — Medication 600 MILLIGRAM(S): at 00:01

## 2024-11-18 RX ADMIN — Medication 80 MILLIGRAM(S): at 05:47

## 2024-11-18 RX ADMIN — ACETAMINOPHEN 500MG 975 MILLIGRAM(S): 500 TABLET, COATED ORAL at 22:10

## 2024-11-18 RX ADMIN — Medication 600 MILLIGRAM(S): at 18:40

## 2024-11-18 RX ADMIN — Medication 600 MILLIGRAM(S): at 23:18

## 2024-11-18 RX ADMIN — ACETAMINOPHEN 500MG 975 MILLIGRAM(S): 500 TABLET, COATED ORAL at 20:51

## 2024-11-18 NOTE — PROGRESS NOTE ADULT - SUBJECTIVE AND OBJECTIVE BOX
Chief Complaint: Diabetes Mellitus follow up    INTERVAL HX:  Patient seen at bedside with           present.   Reports eating well, finishes 100% of food on each tray. BG over the last 24 hrs have been within goal range 100-180mg/dl. No hypoglycemia.     Review of Systems:  General: As above  GI: No nausea, vomiting  Endocrine: no  S&Sx of hypoglycemia    Allergies    No Known Allergies    Intolerances      MEDICATIONS  (STANDING):  acetaminophen     Tablet .. 975 milliGRAM(s) Oral every 6 hours  chlorhexidine 2% Cloths 1 Application(s) Topical <User Schedule>  enoxaparin Injectable 40 milliGRAM(s) SubCutaneous every 24 hours  ibuprofen  Tablet. 600 milliGRAM(s) Oral every 6 hours  insulin lispro (ADMELOG) corrective regimen sliding scale   SubCutaneous three times a day before meals  insulin lispro (ADMELOG) corrective regimen sliding scale   SubCutaneous at bedtime  polyethylene glycol 3350 17 Gram(s) Oral daily  senna 2 Tablet(s) Oral at bedtime            PHYSICAL EXAM:  VITALS: T(C): 36.9 (11-18-24 @ 08:50)  T(F): 98.5 (11-18-24 @ 08:50), Max: 98.8 (11-18-24 @ 01:27)  HR: 85 (11-18-24 @ 08:50) (78 - 91)  BP: 116/73 (11-18-24 @ 08:50) (114/74 - 118/79)  RR:  (18 - 18)  SpO2:  (96% - 98%)  Wt(kg): --  GENERAL: NAD  Respiratory: Respirations unlabored   Extremities: Warm, no edema  NEURO: Alert , appropriate     LABS:  POCT Blood Glucose.: 130 mg/dL (11-18-24 @ 08:51)  POCT Blood Glucose.: 144 mg/dL (11-17-24 @ 22:43)  POCT Blood Glucose.: 168 mg/dL (11-17-24 @ 20:38)  POCT Blood Glucose.: 99 mg/dL (11-17-24 @ 18:43)  POCT Blood Glucose.: 96 mg/dL (11-17-24 @ 09:02)  POCT Blood Glucose.: 106 mg/dL (11-16-24 @ 23:24)  POCT Blood Glucose.: 133 mg/dL (11-16-24 @ 16:08)  POCT Blood Glucose.: 138 mg/dL (11-16-24 @ 11:06)  POCT Blood Glucose.: 139 mg/dL (11-16-24 @ 07:50)  POCT Blood Glucose.: 136 mg/dL (11-16-24 @ 05:09)  POCT Blood Glucose.: 143 mg/dL (11-16-24 @ 02:37)  POCT Blood Glucose.: 207 mg/dL (11-15-24 @ 23:21)  POCT Blood Glucose.: 190 mg/dL (11-15-24 @ 22:01)  POCT Blood Glucose.: 161 mg/dL (11-15-24 @ 21:00)  POCT Blood Glucose.: 145 mg/dL (11-15-24 @ 20:01)  POCT Blood Glucose.: 172 mg/dL (11-15-24 @ 18:54)  POCT Blood Glucose.: 141 mg/dL (11-15-24 @ 18:04)  POCT Blood Glucose.: 167 mg/dL (11-15-24 @ 17:12)  POCT Blood Glucose.: 178 mg/dL (11-15-24 @ 16:06)  POCT Blood Glucose.: 168 mg/dL (11-15-24 @ 15:04)  POCT Blood Glucose.: 186 mg/dL (11-15-24 @ 13:55)                          9.7    10.60 )-----------( 267      ( 18 Nov 2024 06:29 )             29.4     11-17    139  |  106  |  10  ----------------------------<  80  3.6   |  21[L]  |  0.55    Ca    8.5      17 Nov 2024 07:22    TPro  5.4[L]  /  Alb  2.7[L]  /  TBili  0.2  /  DBili  x   /  AST  17  /  ALT  13  /  AlkPhos  138[H]  11-17        Thyroid Function Tests:              Diet, Consistent Carbohydrate w/Evening Snack (11-15-24 @ 15:54) [Active]

## 2024-11-18 NOTE — PROGRESS NOTE ADULT - SUBJECTIVE AND OBJECTIVE BOX
OB Postpartum Note:  Delivery    Wolof  ID#    S: 38yo now POD #3 s/p -hysterectomy complicated by euglycemic ketoacidosis.  Her pain is well controlled. She is tolerating a regular diet and passing flatus. Voiding spontaneously and ambulating without difficulty. Denies Nausea/vomiting/CP/SOB/lightheadedness/dizziness/headache/epigastric pain/changes in vision.    O:   Vitals:  Vital Signs Last 24 Hrs  T(C): 37.1 (), Max: 37.1 ()  T(F): 98.8 (), Max: 98.8 ()  HR: 88 () (82 - 91)  BP: 116/75 () (108/74 - 118/80)  BP(mean): --  RR: 18 () (18 - 18)  SpO2: 96% () (95% - 97%)    Parameters below as of   Patient On (Oxygen Delivery Method): room air        MEDICATIONS  (STANDING):  acetaminophen     Tablet .. 975 milliGRAM(s) Oral every 6 hours  chlorhexidine 2% Cloths 1 Application(s) Topical <User Schedule>  enoxaparin Injectable 40 milliGRAM(s) SubCutaneous every 24 hours  ibuprofen  Tablet. 600 milliGRAM(s) Oral every 6 hours  insulin lispro (ADMELOG) corrective regimen sliding scale   SubCutaneous three times a day before meals  insulin lispro (ADMELOG) corrective regimen sliding scale   SubCutaneous at bedtime  polyethylene glycol 3350 17 Gram(s) Oral daily  senna 2 Tablet(s) Oral at bedtime    MEDICATIONS  (PRN):  oxyCODONE    IR 5 milliGRAM(s) Oral every 4 hours PRN Moderate Pain (4 - 6)  oxyCODONE    IR 10 milliGRAM(s) Oral every 4 hours PRN Severe Pain (7 - 10)  simethicone 80 milliGRAM(s) Chew every 6 hours PRN Indigestion      Labs:  Blood type: O Positive  Rubella IgG: RPR: Negative                          10.1[L]   13.72[H] >-----------< 275    (  @ 07:14 )             30.9[L]                        10.6[L]   19.87[H] >-----------< 313    (  @ 12:47 )             32.0[L]                        9.3[L]   18.73[H] >-----------< 266    ( 15 @ 23:40 )             28.3[L]                        10.1[L]   21.76[H] >-----------< 298    ( 11-15 @ 20:25 )             30.3[L]                        10.5[L]   24.46[H] >-----------< 295    ( 11-15 @ 16:22 )             31.6[L]                        10.9[L]   18.81[H] >-----------< 267    ( 11-15 @ 10:50 )             34.1[L]                        8.5[L]   16.87[H] >-----------< 285    ( 11-15 @ 07:52 )             26.2[L]    24 @ 07:22      139  |  106  |  10  ----------------------------<  80  3.6   |  21[L]  |  0.55    24 @ 12:47      133[L]  |  103  |  13  ----------------------------<  137[H]  4.0   |  19[L]  |  0.64    11-15-24 @ 23:40      134[L]  |  106  |  12  ----------------------------<  202[H]  4.2   |  18[L]  |  0.82    11-15-24 @ 20:25      136  |  108  |  10  ----------------------------<  160[H]  4.2   |  16[L]  |  0.78    11-15-24 @ 16:22      135  |  106  |  9   ----------------------------<  197[H]  4.3   |  13[L]  |  0.54    11-15-24 @ 10:50      137  |  106  |  8   ----------------------------<  179[H]  4.4   |  10[LL]  |  0.48[L]    11-15-24 @ 07:52      137  |  105  |  9   ----------------------------<  152[H]  4.4   |  14[L]  |  0.51        Ca    8.5      2024 07:22  Ca    8.4      2024 12:47  Ca    7.8[L]      15 Nov 2024 23:40  Ca    8.1[L]      15 Nov 2024 20:25  Ca    8.0[L]      15 Nov 2024 16:22  Ca    8.2[L]      15 Nov 2024 10:50  Ca    7.7[L]      15 Nov 2024 07:52  Phos  2.8     11-16  Phos  3.1     11-15  Phos  3.3     11-15  Phos  3.9     11-15  Phos  4.8[H]     11-15  Mg     1.9     11-16  Mg     2.1     11-15  Mg     2.2     11-15  Mg     2.4     11-15  Mg     2.8[H]     -15    TPro  5.4[L]  /  Alb  2.7[L]  /  TBili  0.2  /  DBili  x   /  AST  17  /  ALT  13  /  AlkPhos  138[H]  24 @ 07:22  TPro  5.7[L]  /  Alb  2.7[L]  /  TBili  0.2  /  DBili  x   /  AST  17  /  ALT  16  /  AlkPhos  150[H]  24 @ 12:47  TPro  4.6[L]  /  Alb  2.3[L]  /  TBili  0.2  /  DBili  x   /  AST  18  /  ALT  13  /  AlkPhos  146[H]  11-15-24 @ 23:40  TPro  5.2[L]  /  Alb  2.5[L]  /  TBili  0.3  /  DBili  x   /  AST  20  /  ALT  14  /  AlkPhos  159[H]  11-15-24 @ 20:25  TPro  5.2[L]  /  Alb  2.6[L]  /  TBili  0.5  /  DBili  x   /  AST  19  /  ALT  16  /  AlkPhos  167[H]  11-15-24 @ 16:22  TPro  5.6[L]  /  Alb  2.6[L]  /  TBili  0.8  /  DBili  x   /  AST  22  /  ALT  19  /  AlkPhos  177[H]  11-15-24 @ 10:50  TPro  5.4[L]  /  Alb  2.5[L]  /  TBili  0.3  /  DBili  x   /  AST  18  /  ALT  17  /  AlkPhos  169[H]  11-15-24 @ 07:52          PE:  General: NAD, patient resting comfortably in bed  Abdomen: Mildly distended, appropriately tender. Fundus firm.  Incision: Clean, dry, intact.  : appropriate amount of lochia on pad  Extremities: SCDs in place, no erythema

## 2024-11-18 NOTE — PROGRESS NOTE ADULT - ATTENDING COMMENTS
Obstetrical  8am-6pm:  Patient seen and examined by me. Agree with above resident note. Incision clean, dry and intact. Discussed planned discharge for tomorrow. Patient and partner expressed understanding of her care, all questions answered and preparing for planned discharge.  Liliana MORAN Obstetrical  8am-6pm: Dianelys : Macario #[id number]  Patient seen and examined by me. Agree with above resident note. Incision clean, dry and intact. Discussed planned discharge for tomorrow. Patient and partner expressed understanding of her care, all questions answered and preparing for planned discharge.  Liliana MORAN

## 2024-11-18 NOTE — PROGRESS NOTE ADULT - ASSESSMENT
The patient is a 39y Female with PMH of HTN who presented to the hospital for .   Endocrinology consulted for concern for euglycemic DKA-now resolved.     #Steroid-induced hyperglycemia  #Euglycemic DKA  - Follows with: PCP  - eGFR: 123 mL/min/1.73m2 (11-15-24)  - Weight (kg): 93 (11-15-24)  - glucose above goal, BHB 4.2, bicarb 10, pH 7.23  - betamethasone X2  Technically, a diagnosis of euglycemic DKA requires a diagnosis of diabetes. This is unclear in this patient. Her fingersticks are higher than expected but she denies history of GDM or T2DM. Unclear what level of prenatal care she received.    INPATIENT PLAN:  - Insulin drip stopped last night as euglycemic DKA resolved.   - No longer needs q4 labs.  - Continue consistent carbohydrate diet.   - Continue checking BG before meals and at bedtime  - Continue with low-dose admelog correction with meals and separate correction at bedtime only  - A1C will not be reliable in post-partum state, especially as patient received 2 units PRBC in the OR.     DISCHARGE PLANNING:  Patient to check  Blood glucose 2x daily at home-Please order glucometer, lancets, test strips.  Patient to follow a Consistent carb diet. counseling and handouts provided to patient today.  Patient to follow up with primary care to have her A1c checked and followed every 3 months.        #Hypertension  - Goal BP <130/80  - Management as per primary team  - check urine albumin level as outpatient    Contact via Microsoft Teams during business hours  To reach covering provider access AMION via sunrise tools  For Urgent matters/after-hours/weekends/holidays please page endocrine fellow on call   For nonurgent matters please email [email or web address]    Please note that this patient may be followed by different provider tomorrow.  Notify endocrine 24 hours prior to discharge for final recommendations

## 2024-11-18 NOTE — PROGRESS NOTE ADULT - ASSESSMENT
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 Patient received 2U of pRBCs intra-op and was in the SICU for euglycemic DKA given noted beta-hydroxybutyrate. DKA resolved and insulin drip discontinued, downgraded to postpartum floor . Patient is hemodynamically stable. Reporting gassy distention, nausea, and vomiting.     Neuro: Pain well controlled on IV Tylenol, Oxy PRN, and Dilaudid PRN  CV: Hemodynamically stable. Hct 26->(2u pRBC)->34.1->31.6->30.3->28.3->32.0->30.9  Pulm: Saturating well on room air  GI: Consistent carb diet  - Senna, Simethicone PRN  - Continue to monitor for nausea/vomiting/flatus. Consider imaging to rule out ileus vs. SBO if patient continuing to not tolerate PO today.   :  - s/p Ucath  - voiding spontaneously   Heme: DVT ppx w/ SCD's while in bed. Currently on Lovenox  ID: Afebrile  Endo: Euglycemic DKA now resolved, monitor FS, patient now s/p insulin gtt  - c/w ISS   Dispo: Continue inpatient care     Jacey Moore PGY3

## 2024-11-19 ENCOUNTER — TRANSCRIPTION ENCOUNTER (OUTPATIENT)
Age: 39
End: 2024-11-19

## 2024-11-19 VITALS — WEIGHT: 205.03 LBS

## 2024-11-19 DIAGNOSIS — O44.00 COMPLETE PLACENTA PREVIA NOS OR WITHOUT HEMORRHAGE, UNSPECIFIED TRIMESTER: ICD-10-CM

## 2024-11-19 DIAGNOSIS — R73.9 HYPERGLYCEMIA, UNSPECIFIED: ICD-10-CM

## 2024-11-19 LAB — GLUCOSE BLDC GLUCOMTR-MCNC: 84 MG/DL — SIGNIFICANT CHANGE UP (ref 70–99)

## 2024-11-19 PROCEDURE — 85018 HEMOGLOBIN: CPT

## 2024-11-19 PROCEDURE — 83605 ASSAY OF LACTIC ACID: CPT

## 2024-11-19 PROCEDURE — 82435 ASSAY OF BLOOD CHLORIDE: CPT

## 2024-11-19 PROCEDURE — C1889: CPT

## 2024-11-19 PROCEDURE — C9399: CPT

## 2024-11-19 PROCEDURE — 87389 HIV-1 AG W/HIV-1&-2 AB AG IA: CPT

## 2024-11-19 PROCEDURE — 86901 BLOOD TYPING SEROLOGIC RH(D): CPT

## 2024-11-19 PROCEDURE — 36415 COLL VENOUS BLD VENIPUNCTURE: CPT

## 2024-11-19 PROCEDURE — 84132 ASSAY OF SERUM POTASSIUM: CPT

## 2024-11-19 PROCEDURE — 85384 FIBRINOGEN ACTIVITY: CPT

## 2024-11-19 PROCEDURE — 80053 COMPREHEN METABOLIC PANEL: CPT

## 2024-11-19 PROCEDURE — 82330 ASSAY OF CALCIUM: CPT

## 2024-11-19 PROCEDURE — 84100 ASSAY OF PHOSPHORUS: CPT

## 2024-11-19 PROCEDURE — 86780 TREPONEMA PALLIDUM: CPT

## 2024-11-19 PROCEDURE — 85396 CLOTTING ASSAY WHOLE BLOOD: CPT

## 2024-11-19 PROCEDURE — 85014 HEMATOCRIT: CPT

## 2024-11-19 PROCEDURE — 99232 SBSQ HOSP IP/OBS MODERATE 35: CPT

## 2024-11-19 PROCEDURE — 82010 KETONE BODYS QUAN: CPT

## 2024-11-19 PROCEDURE — 97116 GAIT TRAINING THERAPY: CPT

## 2024-11-19 PROCEDURE — 85730 THROMBOPLASTIN TIME PARTIAL: CPT

## 2024-11-19 PROCEDURE — 86850 RBC ANTIBODY SCREEN: CPT

## 2024-11-19 PROCEDURE — C1894: CPT

## 2024-11-19 PROCEDURE — 86900 BLOOD TYPING SEROLOGIC ABO: CPT

## 2024-11-19 PROCEDURE — 85610 PROTHROMBIN TIME: CPT

## 2024-11-19 PROCEDURE — 85025 COMPLETE CBC W/AUTO DIFF WBC: CPT

## 2024-11-19 PROCEDURE — 82803 BLOOD GASES ANY COMBINATION: CPT

## 2024-11-19 PROCEDURE — 83735 ASSAY OF MAGNESIUM: CPT

## 2024-11-19 PROCEDURE — 97110 THERAPEUTIC EXERCISES: CPT

## 2024-11-19 PROCEDURE — 74018 RADEX ABDOMEN 1 VIEW: CPT

## 2024-11-19 PROCEDURE — 86923 COMPATIBILITY TEST ELECTRIC: CPT

## 2024-11-19 PROCEDURE — C1769: CPT

## 2024-11-19 PROCEDURE — 85027 COMPLETE CBC AUTOMATED: CPT

## 2024-11-19 PROCEDURE — 88307 TISSUE EXAM BY PATHOLOGIST: CPT

## 2024-11-19 PROCEDURE — 81001 URINALYSIS AUTO W/SCOPE: CPT

## 2024-11-19 PROCEDURE — 82962 GLUCOSE BLOOD TEST: CPT

## 2024-11-19 PROCEDURE — 88302 TISSUE EXAM BY PATHOLOGIST: CPT

## 2024-11-19 PROCEDURE — 82947 ASSAY GLUCOSE BLOOD QUANT: CPT

## 2024-11-19 PROCEDURE — C1758: CPT

## 2024-11-19 PROCEDURE — 72195 MRI PELVIS W/O DYE: CPT | Mod: MC

## 2024-11-19 PROCEDURE — P9016: CPT

## 2024-11-19 PROCEDURE — 84295 ASSAY OF SERUM SODIUM: CPT

## 2024-11-19 PROCEDURE — 97165 OT EVAL LOW COMPLEX 30 MIN: CPT

## 2024-11-19 PROCEDURE — T1013: CPT

## 2024-11-19 PROCEDURE — 97161 PT EVAL LOW COMPLEX 20 MIN: CPT

## 2024-11-19 RX ORDER — IBUPROFEN 200 MG
1 TABLET ORAL
Qty: 0 | Refills: 0 | DISCHARGE
Start: 2024-11-19

## 2024-11-19 RX ORDER — SIMETHICONE 125 MG
1 CAPSULE ORAL
Qty: 0 | Refills: 0 | DISCHARGE
Start: 2024-11-19

## 2024-11-19 RX ORDER — ISOPROPYL ALCOHOL, BENZOCAINE .7; .06 ML/ML; ML/ML
0 SWAB TOPICAL
Qty: 100 | Refills: 1
Start: 2024-11-19

## 2024-11-19 RX ORDER — POLYETHYLENE GLYCOL 3350 17 G/17G
17 POWDER, FOR SOLUTION ORAL
Qty: 0 | Refills: 0 | DISCHARGE
Start: 2024-11-19

## 2024-11-19 RX ORDER — ACETAMINOPHEN 500MG 500 MG/1
3 TABLET, COATED ORAL
Qty: 0 | Refills: 0 | DISCHARGE
Start: 2024-11-19

## 2024-11-19 RX ADMIN — ACETAMINOPHEN 500MG 975 MILLIGRAM(S): 500 TABLET, COATED ORAL at 02:52

## 2024-11-19 RX ADMIN — Medication 600 MILLIGRAM(S): at 12:45

## 2024-11-19 RX ADMIN — Medication 600 MILLIGRAM(S): at 06:18

## 2024-11-19 RX ADMIN — ACETAMINOPHEN 500MG 975 MILLIGRAM(S): 500 TABLET, COATED ORAL at 09:35

## 2024-11-19 RX ADMIN — ACETAMINOPHEN 500MG 975 MILLIGRAM(S): 500 TABLET, COATED ORAL at 10:30

## 2024-11-19 RX ADMIN — ENOXAPARIN SODIUM 40 MILLIGRAM(S): 30 INJECTION SUBCUTANEOUS at 09:35

## 2024-11-19 RX ADMIN — Medication 600 MILLIGRAM(S): at 13:40

## 2024-11-19 NOTE — DIETITIAN INITIAL EVALUATION ADULT - ENERGY INTAKE
Adequate (%) Currently in-house, pt reported good appetite and PO intake, no significant changes. Pt stated she is satisfied with current food options provided in-house. Per chart, pt noted to be consuming 100% of food on each tray.

## 2024-11-19 NOTE — DISCHARGE NOTE OB - MEDICATION SUMMARY - MEDICATIONS TO TAKE
I will START or STAY ON the medications listed below when I get home from the hospital:    test strips (per patient's insurance)  -- 1 application subcutaneously 4 times a day. ** Compatible with patient's glucometer **  -- Indication: For Blood glucose elevated    lancets  -- 1 application subcutaneously 4 times a day  -- Indication: For Blood glucose elevated    glucometer (per patient's insurance)  -- Test blood sugars four times a day. Dispense #1 glucometer.  -- Indication: For Blood glucose elevated    alcohol swabs  -- Apply on skin to affected area 4 times a day  -- Indication: For Blood glucose elevated    ibuprofen 600 mg oral tablet  -- 1 tab(s) by mouth every 6 hours as needed for  moderate pain  -- Indication: For Pain    acetaminophen 325 mg oral tablet  -- 3 tab(s) by mouth every 6 hours as needed for  mild pain  -- Indication: For Pain    Prenatal 1 oral capsule  -- 1 cap(s) by mouth once a day  -- Indication: For Supervision of other normal pregnancy, antepartum    polyethylene glycol 3350 oral powder for reconstitution  -- 17 gram(s) by mouth once a day as needed for  constipation  -- Indication: For Constipation    simethicone 80 mg oral tablet, chewable  -- 1 tab(s) by mouth every 6 hours As needed Indigestion  -- Indication: For Gas

## 2024-11-19 NOTE — DIETITIAN INITIAL EVALUATION ADULT - EDUCATION DIETARY MODIFICATIONS
Cryotherapy Text: The wound bed was treated with cryotherapy after the biopsy was performed. RD offered education materials to pt and spouse, pt and spouse at bedside accepted materials in English./(2) meets goals/outcomes/verbalization

## 2024-11-19 NOTE — DIETITIAN INITIAL EVALUATION ADULT - NSFNSGIIOFT_GEN_A_CORE
Pt reported no nausea or vomiting at this time.   - Ordered for Mylicon (per orders).   Pt reported constipation in-house, stated last BM was prior to "surgery."   - Ordered for Miralax and Senna (per orders).   RD provided constipation nutrition therapy, encouraged adequate hydration and consumption of fiber-containing foods as tolerated. Monitor bowel movements and bowel movement regularity. Adjust bowel regimen as needed.

## 2024-11-19 NOTE — DISCHARGE NOTE OB - PLAN OF CARE
Monitor your fingersticks before breakfast and before bedtime.  Follow up with your primary care provider in 2 weeks. After discharge, please stay on pelvic rest for 6 weeks, meaning no sexual intercourse, no tampons and no douching.  No driving for 2 weeks as women can loose a lot of blood during delivery and there is a possibility of being lightheaded/fainting.  No lifting objects heavier than a gallon of milk for two weeks.  Expect to have vaginal bleeding/spotting for up to six weeks.  The bleeding should get lighter and more white/light brown with time.  For bleeding soaking more than a pad an hour or passing clots greater than the size of your fist, come in to the emergency department.    Follow up in clinic in 2 weeks for incision check.  Call clinic for noticeable increase in redness or swelling at incision, discharge from incision, or opening of skin at incision site. After discharge, please stay on pelvic rest for 6 weeks, meaning no sexual intercourse, no tampons and no douching.  No driving for 2 weeks as women can lose a lot of blood during delivery and there is a possibility of being lightheaded/fainting.  No lifting objects heavier than a gallon of milk for two weeks.  Expect to have vaginal bleeding/spotting for up to six weeks.  The bleeding should get lighter and more white/light brown with time.  For bleeding soaking more than a pad an hour or passing clots greater than the size of your fist, come in to the emergency department.    Follow up in clinic in 2 weeks for incision check.  Call clinic for noticeable increase in redness or swelling at incision, discharge from incision, or opening of skin at incision site.

## 2024-11-19 NOTE — DIETITIAN INITIAL EVALUATION ADULT - REASON INDICATOR FOR ASSESSMENT
RD consult warranted for "newly diagnosed DM, S/P C-hyst at 33 wks gest, multip. Pt states never told was a diabetic."  Source: Patient, Family at Bedside,  (ID#[id number], Rayna), Electronic Medical Record  Chart reviewed, events noted.

## 2024-11-19 NOTE — PROGRESS NOTE ADULT - SUBJECTIVE AND OBJECTIVE BOX
DIABETES FOLLOW UP NOTE: Saw pt earlier today    Chief Complaint: Endocrine consult requested for management of DM    INTERVAL HX: Pt stable, Used [employer] official  # [id number] [place]. Pt reports tolerating POs with BGs between 80s to low 100s. Noted FBG not done today. Per pt and  no one came to test BG at breakfast time so pt ate. Per RN, pt didn't call to get BG tested before eating. Pt has not learned how to test BG yet. Received education about potential overt DM after delivery and the need to monitor BG and f/u with PCP. Pt very eager to go home today.        Review of Systems:  General: As above  Cardiovascular: No chest pain, palpitations  Respiratory: No SOB, no cough  GI: No nausea, vomiting, abdominal pain  Endocrine: No polyuria, polydipsia or S&Sx of hypoglycemia    Allergies    No Known Allergies    Intolerances      MEDICATIONS:  insulin lispro (ADMELOG) corrective regimen sliding scale   SubCutaneous three times a day before meals  insulin lispro (ADMELOG) corrective regimen sliding scale   SubCutaneous at bedtime      PHYSICAL EXAM:  VITALS: T(C): 36.7 (11-19-24 @ 12:41)  T(F): 98.1 (11-19-24 @ 12:41), Max: 98.6 (11-18-24 @ 17:27)  HR: 76 (11-19-24 @ 12:41) (67 - 84)  BP: 120/80 (11-19-24 @ 12:41) (108/76 - 135/84)  RR:  (18 - 18)  SpO2:  (97% - 99%)  Wt(kg): --  GENERAL: Female sitting in chair in NAD.  at bedside  Abdomen: Soft, nontender, non distended, post partum uterus.   Extremities: Warm, no edema in all 4 exts  NEURO: A&O X3    LABS:  POCT Blood Glucose.: 84 mg/dL (11-19-24 @ 13:12)  POCT Blood Glucose.: 137 mg/dL (11-18-24 @ 22:00)  POCT Blood Glucose.: 119 mg/dL (11-18-24 @ 18:48)  POCT Blood Glucose.: 85 mg/dL (11-18-24 @ 13:14)  POCT Blood Glucose.: 130 mg/dL (11-18-24 @ 08:51)  POCT Blood Glucose.: 144 mg/dL (11-17-24 @ 22:43)  POCT Blood Glucose.: 168 mg/dL (11-17-24 @ 20:38)  POCT Blood Glucose.: 99 mg/dL (11-17-24 @ 18:43)  POCT Blood Glucose.: 96 mg/dL (11-17-24 @ 09:02)  POCT Blood Glucose.: 106 mg/dL (11-16-24 @ 23:24)  POCT Blood Glucose.: 133 mg/dL (11-16-24 @ 16:08)                            9.7    10.60 )-----------( 267      ( 18 Nov 2024 06:29 )             29.4       11-17    139  |  106  |  10  ----------------------------<  80  3.6   |  21[L]  |  0.55    eGFR: 119    Ca    8.5      11-17    TPro  5.4[L]  /  Alb  2.7[L]  /  TBili  0.2  /  DBili  x   /  AST  17  /  ALT  13  /  AlkPhos  138[H]  11-17

## 2024-11-19 NOTE — DIETITIAN INITIAL EVALUATION ADULT - ADD RECOMMEND
1) Recommend continue Consistent Carbohydrate as tolerated. Defer diet texture/consistency to Medical Team.   2) Recommend adding multivitamin and vitamin c daily for micronutrient coverage and wound healing unless contraindicated.   3) Monitor and encourage adequate PO intake, obtain food preferences and honor as able.   4) Monitor electrolytes daily, replete as needed (potassium, magnesium, phosphorous). Obtain updated nutrition-related labs if medically feasible.   5) Monitor nutritional intake, tolerance to diet prescription, bowel movements, weights, labs, and skin integrity.   6) RD remains available for diet education/review as needed.

## 2024-11-19 NOTE — PROGRESS NOTE ADULT - SUBJECTIVE AND OBJECTIVE BOX
OB Postpartum Note    S: 40yo now POD #4 s/p  hysterectomy complicated by euglycemic ketoacidosis. Her pain is well controlled. She is tolerating a regular diet and passing flatus. Voiding spontaneously and ambulating without difficulty. Denies Nausea/vomiting/CP/SOB/lightheadedness/dizziness/headache/epigastric pain/changes in vision.    O:   Vitals:  Vital Signs Last 24 Hrs  T(C): 36.5 (2024 05:28), Max: 37 (2024 17:27)  T(F): 97.7 (2024 05:28), Max: 98.6 (2024 17:27)  HR: 67 (2024 05:28) (67 - 85)  BP: 126/84 (2024 05:28) (108/76 - 126/84)  BP(mean): --  RR: 18 (2024 05:28) (18 - 18)  SpO2: 97% (2024 05:28) (97% - 98%)    Parameters below as of 2024 05:28  Patient On (Oxygen Delivery Method): room air        MEDICATIONS  (STANDING):  acetaminophen     Tablet .. 975 milliGRAM(s) Oral every 6 hours  chlorhexidine 2% Cloths 1 Application(s) Topical <User Schedule>  enoxaparin Injectable 40 milliGRAM(s) SubCutaneous every 24 hours  ibuprofen  Tablet. 600 milliGRAM(s) Oral every 6 hours  insulin lispro (ADMELOG) corrective regimen sliding scale   SubCutaneous three times a day before meals  insulin lispro (ADMELOG) corrective regimen sliding scale   SubCutaneous at bedtime  polyethylene glycol 3350 17 Gram(s) Oral daily  senna 2 Tablet(s) Oral at bedtime    MEDICATIONS  (PRN):  oxyCODONE    IR 5 milliGRAM(s) Oral every 4 hours PRN Moderate Pain (4 - 6)  oxyCODONE    IR 10 milliGRAM(s) Oral every 4 hours PRN Severe Pain (7 - 10)  simethicone 80 milliGRAM(s) Chew every 6 hours PRN Indigestion      Labs:  Blood type: O Positive  Rubella IgG: RPR: Negative                          9.7[L]   10.60[H] >-----------< 267    ( 11-18 @ 06:29 )             29.4[L]                        10.1[L]   13.72[H] >-----------< 275    (  @ 07:14 )             30.9[L]                        10.6[L]   19.87[H] >-----------< 313    (  @ 12:47 )             32.0[L]    24 @ 07:22      139  |  106  |  10  ----------------------------<  80  3.6   |  21[L]  |  0.55    24 @ 12:47      133[L]  |  103  |  13  ----------------------------<  137[H]  4.0   |  19[L]  |  0.64        Ca    8.5      2024 07:22  Ca    8.4      2024 12:47  Phos  2.8       Mg     1.9         TPro  5.4[L]  /  Alb  2.7[L]  /  TBili  0.2  /  DBili  x   /  AST  17  /  ALT  13  /  AlkPhos  138[H]  24 @ 07:22  TPro  5.7[L]  /  Alb  2.7[L]  /  TBili  0.2  /  DBili  x   /  AST  17  /  ALT  16  /  AlkPhos  150[H]  24 @ 12:47          PE:  General: NAD, patient resting comfortably in bed  Abdomen: not distended, appropriately tender. Fundus firm.  Incision: midline vertical incision is Clean, dry, intact.  : appropriate amount of lochia on pad  Extremities: SCDs in place, no erythema

## 2024-11-19 NOTE — DIETITIAN INITIAL EVALUATION ADULT - NUTRITIONGOAL OUTCOME2
Pt will be able to adhere to nutrition related recommendations to improve nutritional status and labs.

## 2024-11-19 NOTE — DIETITIAN INITIAL EVALUATION ADULT - NSFNSNUTRHOMESUPPLEMENTFT_GEN_A_CORE
Pt stated she took Vitamin B at home. Ferrous Sulfate and Prenatal Multivitamin noted in H&P medication list.  Pt declined use of oral nutrition supplements prior to admission.

## 2024-11-19 NOTE — PROGRESS NOTE ADULT - ASSESSMENT
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 Patient received 2U of pRBCs intra-op and was in the SICU for euglycemic DKA given noted beta-hydroxybutyrate. DKA resolved and insulin drip discontinued, downgraded to postpartum floor . Patient is hemodynamically stable. Reporting gassy distention, nausea, and vomiting.     Neuro: Pain well controlled on IV Tylenol, Oxy PRN, and Dilaudid PRN  CV: Hemodynamically stable. Hct 26->(2u pRBC)->34.1->31.6->30.3->28.3->32.0->30.9  Pulm: Saturating well on room air  GI: Consistent carb diet  - Senna, Simethicone PRN  - Continue to monitor for nausea/vomiting/flatus. Consider imaging to rule out ileus vs. SBO if patient continuing to not tolerate PO today.   :  - s/p Ucath  - voiding spontaneously   Heme: DVT ppx w/ SCD's while in bed. 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 Patient received 2U of pRBCs intra-op and was in the SICU for euglycemic DKA given noted beta-hydroxybutyrate. DKA resolved and insulin drip discontinued, downgraded to postpartum floor . Patient is hemodynamically stable. Reporting gassy distention, nausea, and vomiting.     Neuro: Pain well controlled on IV Tylenol, Oxy PRN, and Dilaudid PRN  CV: Hemodynamically stable. Hct 26->(2u pRBC)->34.1->31.6->30.3->28.3->32.0->30.9->29.4  Pulm: Saturating well on room air  GI: Consistent carb diet  - Senna, Simethicone PRN  - Continue to monitor for nausea/vomiting/flatus. Consider imaging to rule out ileus vs. SBO if patient continuing to not tolerate PO today.   :  - s/p Ucath  - voiding spontaneously   Heme: DVT ppx w/ SCD's while in bed. Currently on Lovenox  ID: Afebrile  Endo: Euglycemic DKA now resolved, monitor FS, patient now s/p insulin gtt  - c/w ISS   - endocrinology following, appreciate recs  Dispo: discharge planning    Jacey Moore PGY3

## 2024-11-19 NOTE — PROGRESS NOTE ADULT - ASSESSMENT
39y F with not h/o DM or pre DM. Pt states she passed GTT during her pregnancy but is not sure if BG levels were checked after that.  @ 33w4d who presents for care coordination in the setting of recent ultrasound imaging findings concerning for accreta vs. increta on 2024. Now s/p  hysterectomy 11/15/24. > pt received Received  betamethasone X2 and 2U of pRBCs intra-op and was in the SICU for euglycemic DKA? requiring insulin drip. Endocrinology consulted for concern for euglycemic DKA versus metabolic acidosis versus steroid induced hyperglycemia >now resolved. Tolerating POs and  also breast feeding. BG mostly at goal while on present Admelog correction scale> not requiring any insulin as this time. No fasting BG obtain today with pt having a fasting BG <80 and 90s 2 days ago and 133 yesterday. Per primary team pt going home.    Met with patient and reviewed the following:  - Unclear if pt had GDM versus pre DM versus DM during pregnancy since she was followed by [facility] group> per pt, she didn't have any issues with elevated BGs while pregnant. Pt received Dexa at time of surgery that could had caused some abnormal BG levels after surgery.  Pt is aware of the need to monitor BG as out pt and f/u with PCP to r/o Pre DM or DM.   -Blood glucose goals: 70 to 99 fasting   -Glucose monitoring frequency: fasting and at bedtime until she sees PCP  -Healthy eating and portion control. Encouraged pt to avoid juices and regular soda, drinks and to drink lots of water seltzer while breast feeding  -No DM meds needed at this time but pt to report to PCP if FBG > 126 at home   -Importance of follow up care. Needs to f/u with PCP and pvt OBGYN group post partum   Pt able to verbalize understanding regarding the need for glucose monitoring and follow up care. Pt able to understand education but keeps asking if she can leave soon.   Spoke to pt's RN to teach and allow pt to do own POC testing  and also teach use of home glucose meter  before pt leaves.

## 2024-11-19 NOTE — DIETITIAN INITIAL EVALUATION ADULT - OTHER CALCULATIONS
Estimated protein-energy needs calculated using reported pre-pregnancy weight of 84 kg with consideration for BMI, breastfeeding, s/p cesarian delivery.   Defer fluid calculations to the medical team.

## 2024-11-19 NOTE — DISCHARGE NOTE OB - HOSPITAL COURSE
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 Patient received 2U of pRBCs intra-op and was in the SICU for euglycemic DKA given noted beta-hydroxybutyrate. DKA resolved and insulin drip discontinued, downgraded to postpartum floor . Patient is hemodynamically stable. Reporting gassy distention, nausea, and vomiting.    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 Patient received 2U of pRBCs intra-op and was in the SICU for euglycemic DKA given noted beta-hydroxybutyrate. DKA resolved and insulin drip discontinued, downgraded to postpartum floor 11/16. Patient is hemodynamically stable. Patient seen by Endocrinology inpatient with recommendation for outpatient fingerstick monitoring and follow up with her PCP. Pt discharged in stable condition on POD#4.

## 2024-11-19 NOTE — DIETITIAN INITIAL EVALUATION ADULT - SIGNS/SYMPTOMS
as evidenced by pt with elevated blood glucose levels post cesarian delivery.  as evidenced by pt breastfeeding post cesarian delivery.

## 2024-11-19 NOTE — PROGRESS NOTE ADULT - PROBLEM SELECTOR PLAN 1
- Check BG before meals and at bedtime  - Continue with low-dose admelog correction with meals and separate correction at bedtime only. No requiring any insulin  - A1C will not be reliable in post-partum state, especially as patient received 2 units PRBC in the OR.   - Please teach and allow pt to do own finger sticks under RN supervision, teach use of home glucose meter and document teach back  - Pt instructed to call RN to test BG before eating.   DISCHARGE PLANNING:  Test BG fasting and at hs until f/u with pcp  No DM meds required at this time  Please order glucometer, lancets, test strips and teach pt its use  Patient to follow up with pcp to assess BG values to r/o DM. Can f/u A1c in 3 months due to recent transfusion A1C.

## 2024-11-19 NOTE — DIETITIAN INITIAL EVALUATION ADULT - OTHER INFO
Weights:  - Pre-Pregnancy Weight (per patient): 84 kg (184.8 pounds)  - Recent Weight Prior To Admission (unclear timeframe): 93 kg (204.6 pounds)  - Dosing Weight (per chart): 93 kg (205 pounds) (11/15)  - Weight Gain (per chart): ~20 pounds   - Daily Weights (per flow sheets): No daily weights noted to assess at this time.   Pt endorsed weight gain prior to admission secondary to pregnancy. RD to continue to monitor weights and trends as able.     Nutritional Concerns:  - Pt is POD #4 from  hysterectomy at 33w5d (per chart). Transferred to SICU for euglycemic DKA (per chart). Previously on insulin drip, discontinued 11/15, downgraded to postpartum on  (per chart).  - Per Endocrine, "Patient to follow up with pcp to assess BG values to r/o DM. Can f/u A1c in 3 months due to recent transfusion A1C."   - Ordered for sliding scale insulin in-house (per orders).   - Glucose  mg/dL () (per chart).

## 2024-11-19 NOTE — DISCHARGE NOTE OB - PROVIDER TOKENS
PROVIDER:[TOKEN:[[id number]]],FREE:[LAST:[HRC],PHONE:[(   )    -],FAX:[(   )    -],ADDRESS:[[place] [address]  [phone]]]

## 2024-11-19 NOTE — DISCHARGE NOTE OB - PATIENT PORTAL LINK FT
You can access the FollowMyHealth Patient Portal offered by [facility] by registering at the following website: [email or web address]. By joining [facility]’s FollowMyHealth portal, you will also be able to view your health information using other applications (apps) compatible with our system.

## 2024-11-19 NOTE — DISCHARGE NOTE OB - CARE PROVIDER_API CALL
Belen Barron  Obstetrics and Gynecology  [address]  [place] [address]  Phone: [phone]  Fax: [phone]  Follow Up Time:     [facility],   [facility]  [address] [place] [id number]  [phone]  Phone: (   )    -  Fax: (   )    -  Follow Up Time:

## 2024-11-19 NOTE — DISCHARGE NOTE OB - NS MD DC FALL RISK RISK
For information on Fall & Injury Prevention, visit: [email or web address] OR  [email or web address] OR  https://www.cdc.gov/steadi/patient.html

## 2024-11-19 NOTE — DISCHARGE NOTE OB - CARE PLAN
Principal Discharge DX:	 delivery delivered  Assessment and plan of treatment:	After discharge, please stay on pelvic rest for 6 weeks, meaning no sexual intercourse, no tampons and no douching.  No driving for 2 weeks as women can loose a lot of blood during delivery and there is a possibility of being lightheaded/fainting.  No lifting objects heavier than a gallon of milk for two weeks.  Expect to have vaginal bleeding/spotting for up to six weeks.  The bleeding should get lighter and more white/light brown with time.  For bleeding soaking more than a pad an hour or passing clots greater than the size of your fist, come in to the emergency department.    Follow up in clinic in 2 weeks for incision check.  Call clinic for noticeable increase in redness or swelling at incision, discharge from incision, or opening of skin at incision site.  Secondary Diagnosis:	Blood glucose elevated  Assessment and plan of treatment:	Monitor your fingersticks before breakfast and before bedtime.  Follow up with your primary care provider in 2 weeks.  Secondary Diagnosis:	Metabolic acidosis   1 Principal Discharge DX:	 delivery delivered  Assessment and plan of treatment:	After discharge, please stay on pelvic rest for 6 weeks, meaning no sexual intercourse, no tampons and no douching.  No driving for 2 weeks as women can lose a lot of blood during delivery and there is a possibility of being lightheaded/fainting.  No lifting objects heavier than a gallon of milk for two weeks.  Expect to have vaginal bleeding/spotting for up to six weeks.  The bleeding should get lighter and more white/light brown with time.  For bleeding soaking more than a pad an hour or passing clots greater than the size of your fist, come in to the emergency department.    Follow up in clinic in 2 weeks for incision check.  Call clinic for noticeable increase in redness or swelling at incision, discharge from incision, or opening of skin at incision site.  Secondary Diagnosis:	Blood glucose elevated  Assessment and plan of treatment:	Monitor your fingersticks before breakfast and before bedtime.  Follow up with your primary care provider in 2 weeks.  Secondary Diagnosis:	Metabolic acidosis

## 2024-11-19 NOTE — DIETITIAN INITIAL EVALUATION ADULT - ETIOLOGY
related to increased physiological demands for nutrients  related to endocrine dysfunction, steroid use

## 2024-11-19 NOTE — DIETITIAN INITIAL EVALUATION ADULT - NSFNSPHYEXAMSKINFT_GEN_A_CORE
Surgical Incision: abdomen; medial (per flow sheets)    No pressure injuries noted (per nursing flow sheets).

## 2024-11-19 NOTE — DIETITIAN INITIAL EVALUATION ADULT - NSFNSADHERENCEPTAFT_GEN_A_CORE
Pt reported good appetite/PO intake prior to admission, endorsed no recent significant changes. Pt stated she followed a regular diet prior to admission, no therapeutic restrictions. Endorsed weight gain prior to admission in setting of pregnancy. Pt declined history of GDM or DM.     Off note, per Endocrine, pt " with not h/o DM or pre DM. Pt states she passed GTT during her pregnancy but is not sure if BG levels were checked after that."

## 2024-11-19 NOTE — DIETITIAN INITIAL EVALUATION ADULT - LITERATURE/VIDEOS GIVEN
Carbohydrate Counting for People with Diabetes  Plate Method for Diabetes  Diabetes Label Reading Tips

## 2024-11-19 NOTE — DISCHARGE NOTE OB - FINANCIAL ASSISTANCE
[facility] provides services at a reduced cost to those who are determined to be eligible through [facility]’s financial assistance program. Information regarding [facility]’s financial assistance program can be found by going to [email or web address] or by calling [phone].

## 2024-11-19 NOTE — DIETITIAN INITIAL EVALUATION ADULT - ORAL INTAKE PTA/DIET HISTORY
Nutrition assessment completed at bedside. [place]  used [facility][id number][facility] Pt reported good appetite and PO intake prior to admission, no recent significant changes. Pt endorsed weight gain prior to admission in setting of pregnancy, stated pre-pregnancy weight was 84 kg (184.8 pounds), endorsed most recent weight to be 93 kg (204.6 pounds) prior to admission. Pt reported height of 170 centimeters. Pt confirmed no known food allergies.

## 2024-11-19 NOTE — PROGRESS NOTE ADULT - ATTENDING COMMENTS
Obstetrical  8am-6pm:  #[id number] "Michael"  Patient seen and examined by me. Agree with above resident note. Incision clean, dry and intact. Eating well. No GI complaints offered. To receive glucometer with instructions prior to discharge.  Liliana MORAN

## 2024-11-19 NOTE — DIETITIAN INITIAL EVALUATION ADULT - PERSON TAUGHT/METHOD
Reviewed nutrition therapy for glycemic control. Emphasis on what foods contain carbohydrates, importance of pairing carbohydrates with protein for glycemic control; choosing whole grains vs refined carbohydrates; limiting refined sugars, portion sizes. Good comprehension noted. Pt made aware RD to remain available for any questions.     Discussed the importance of consuming adequate calories and proteins throughout the day. Encouraged consumption of HBV protein sources. Reviewed protein/calorie dense food sources/meal and snack ideas. Encouraged adequate hydration and educated on snacking on protein-dense foods between meals to optimize protein-energy intake. Pt left with no further questions./verbal instruction/written material/patient instructed/spouse instructed

## 2024-11-22 ENCOUNTER — OUTPATIENT (OUTPATIENT)
Dept: OUTPATIENT SERVICES | Facility: HOSPITAL | Age: 39
LOS: 1 days | End: 2024-11-22

## 2024-11-22 ENCOUNTER — APPOINTMENT (OUTPATIENT)
Dept: MATERNAL FETAL MEDICINE | Facility: CLINIC | Age: 39
End: 2024-11-22

## 2024-11-22 ENCOUNTER — NON-APPOINTMENT (OUTPATIENT)
Age: 39
End: 2024-11-22

## 2024-11-22 VITALS
HEIGHT: 66 IN | DIASTOLIC BLOOD PRESSURE: 70 MMHG | WEIGHT: 186.38 LBS | OXYGEN SATURATION: 98 % | SYSTOLIC BLOOD PRESSURE: 110 MMHG | BODY MASS INDEX: 29.95 KG/M2 | HEART RATE: 84 BPM

## 2024-11-22 DIAGNOSIS — Z00.00 ENCOUNTER FOR GENERAL ADULT MEDICAL EXAMINATION W/OUT ABNORMAL FINDINGS: ICD-10-CM

## 2024-11-22 LAB — SURGICAL PATHOLOGY STUDY: SIGNIFICANT CHANGE UP

## 2024-11-22 PROCEDURE — G0463: CPT

## 2024-11-22 PROCEDURE — 99213 OFFICE O/P EST LOW 20 MIN: CPT | Mod: GC

## 2024-12-27 ENCOUNTER — APPOINTMENT (OUTPATIENT)
Dept: MATERNAL FETAL MEDICINE | Facility: CLINIC | Age: 39
End: 2024-12-27

## 2024-12-27 ENCOUNTER — OUTPATIENT (OUTPATIENT)
Dept: OUTPATIENT SERVICES | Facility: HOSPITAL | Age: 39
LOS: 1 days | End: 2024-12-27
Payer: COMMERCIAL

## 2024-12-27 VITALS
HEART RATE: 74 BPM | WEIGHT: 189 LBS | BODY MASS INDEX: 30.37 KG/M2 | OXYGEN SATURATION: 97 % | HEIGHT: 66 IN | SYSTOLIC BLOOD PRESSURE: 104 MMHG | DIASTOLIC BLOOD PRESSURE: 76 MMHG

## 2024-12-27 DIAGNOSIS — O43.109 MALFORMATION OF PLACENTA, UNSPECIFIED, UNSPECIFIED TRIMESTER: ICD-10-CM

## 2024-12-27 PROCEDURE — G0463: CPT

## 2024-12-27 PROCEDURE — 99213 OFFICE O/P EST LOW 20 MIN: CPT

## (undated) DEVICE — STAPLER SKIN VISI-STAT 35 WIDE

## (undated) DEVICE — SUT BIOSYN 1 36" GS-25

## (undated) DEVICE — SUT CHROMIC 2-0 36" CT-1

## (undated) DEVICE — WARMING BLANKET UPPER ADULT

## (undated) DEVICE — FOLEY TRAY 16FR LF URINE METER SURESTEP

## (undated) DEVICE — BLADE SCALPEL SAFETYLOCK #15

## (undated) DEVICE — SUT VLOC 180 0 12" GS-21 GREEN

## (undated) DEVICE — DRAPE MAYO STAND 30"

## (undated) DEVICE — POSITIONER FOAM EGG CRATE ULNAR 2PCS (PINK)

## (undated) DEVICE — BLADE SCALPEL SAFETYLOCK #10

## (undated) DEVICE — Device

## (undated) DEVICE — DRAPE LIGHT HANDLE COVER (BLUE)

## (undated) DEVICE — SUT QUILL MONODERM 3-0 30CM 26MM

## (undated) DEVICE — SUT VLOC 180 0 9" GS-21 GREEN

## (undated) DEVICE — SUT POLYSORB 1 36" GS-21 UNDYED

## (undated) DEVICE — DRAPE INSTRUMENT POUCH 6.75" X 11"

## (undated) DEVICE — SUT VLOC 180 0 18" GS-21 GREEN

## (undated) DEVICE — DRAPE TOWEL BLUE 17" X 24"

## (undated) DEVICE — LAP PAD 18 X 18"

## (undated) DEVICE — DRSG STERISTRIPS 0.5 X 4"

## (undated) DEVICE — GLV 7.5 PROTEXIS (WHITE)

## (undated) DEVICE — MEDICATION LABELS W MARKER

## (undated) DEVICE — MARKING PEN W RULER

## (undated) DEVICE — VENODYNE/SCD SLEEVE CALF MEDIUM

## (undated) DEVICE — ABDOMINAL BINDER XL 9" X 62"-74"

## (undated) DEVICE — PREP CHLORAPREP HI-LITE ORANGE 26ML

## (undated) DEVICE — SPECIMEN CONTAINER 100ML

## (undated) DEVICE — SUT POLYSORB 1 36" GS-21

## (undated) DEVICE — SOL IRR POUR H2O 250ML

## (undated) DEVICE — GLV 7 PROTEXIS (WHITE)

## (undated) DEVICE — VISITEC 4X4

## (undated) DEVICE — GOWN TRIMAX LG

## (undated) DEVICE — PREP CHLOROHEXIDINE 4% 118CC KIT

## (undated) DEVICE — GLV 6.5 PROTEXIS (WHITE)

## (undated) DEVICE — SOL IRR POUR NS 0.9% 500ML

## (undated) DEVICE — PREP BETADINE KIT

## (undated) DEVICE — SUT POLYSORB 0 30" GS-21 UNDYED

## (undated) DEVICE — PACK MAJOR ABDOMINAL SUPINE

## (undated) DEVICE — SUT QUILL MONODERM 3-0 30CM PS-2

## (undated) DEVICE — COVER PROBE W/GEL 18X120CM STRL 50/BX

## (undated) DEVICE — PACK CYSTO

## (undated) DEVICE — SUT CHROMIC 2-0 30" V-20

## (undated) DEVICE — SUT POLYSORB 1 18" UNDYED

## (undated) DEVICE — LIGASURE IMPACT

## (undated) DEVICE — DRSG OPSITE 13.75 X 4"

## (undated) DEVICE — SUT POLYSORB 0 18" MP UNDYED